# Patient Record
Sex: MALE | Race: WHITE | NOT HISPANIC OR LATINO | Employment: FULL TIME | URBAN - METROPOLITAN AREA
[De-identification: names, ages, dates, MRNs, and addresses within clinical notes are randomized per-mention and may not be internally consistent; named-entity substitution may affect disease eponyms.]

---

## 2017-01-30 ENCOUNTER — HISTORICAL (OUTPATIENT)
Dept: RADIOLOGY | Facility: HOSPITAL | Age: 42
End: 2017-01-30

## 2017-03-29 ENCOUNTER — HISTORICAL (OUTPATIENT)
Dept: ADMINISTRATIVE | Facility: HOSPITAL | Age: 42
End: 2017-03-29

## 2017-03-30 ENCOUNTER — HISTORICAL (OUTPATIENT)
Dept: ADMINISTRATIVE | Facility: HOSPITAL | Age: 42
End: 2017-03-30

## 2017-08-18 ENCOUNTER — HISTORICAL (OUTPATIENT)
Dept: RADIOLOGY | Facility: HOSPITAL | Age: 42
End: 2017-08-18

## 2017-08-18 ENCOUNTER — HISTORICAL (OUTPATIENT)
Dept: LAB | Facility: HOSPITAL | Age: 42
End: 2017-08-18

## 2017-08-18 LAB
ABS NEUT (OLG): 5 X10(3)/MCL (ref 1.5–6.9)
ALBUMIN SERPL-MCNC: 4.1 GM/DL (ref 3.4–5)
ALBUMIN/GLOB SERPL: 1.1 RATIO
ALP SERPL-CCNC: 82 UNIT/L (ref 30–113)
ALT SERPL-CCNC: 38 UNIT/L (ref 10–45)
AST SERPL-CCNC: 15 UNIT/L (ref 15–37)
BASOPHILS # BLD AUTO: 0 X10(3)/MCL (ref 0–0.1)
BASOPHILS NFR BLD AUTO: 0 % (ref 0–1)
BILIRUB SERPL-MCNC: 1.2 MG/DL (ref 0.1–0.9)
BILIRUBIN DIRECT+TOT PNL SERPL-MCNC: 0.2 MG/DL (ref 0–0.3)
BILIRUBIN DIRECT+TOT PNL SERPL-MCNC: 1 MG/DL
BUN SERPL-MCNC: 12 MG/DL (ref 10–20)
CALCIUM SERPL-MCNC: 8.8 MG/DL (ref 8–10.5)
CHLORIDE SERPL-SCNC: 108 MMOL/L (ref 100–108)
CO2 SERPL-SCNC: 26 MMOL/L (ref 21–35)
CREAT SERPL-MCNC: 1.02 MG/DL (ref 0.7–1.3)
DEPRECATED CALCIDIOL+CALCIFEROL SERPL-MC: 33.96 NG/ML (ref 30–80)
EOSINOPHIL # BLD AUTO: 0.1 X10(3)/MCL (ref 0–0.6)
EOSINOPHIL NFR BLD AUTO: 2 % (ref 0–5)
ERYTHROCYTE [DISTWIDTH] IN BLOOD BY AUTOMATED COUNT: 13.2 % (ref 11.5–17)
GLOBULIN SER-MCNC: 3.7 GM/DL
GLUCOSE SERPL-MCNC: 94 MG/DL (ref 75–116)
HCT VFR BLD AUTO: 48.3 % (ref 42–52)
HGB BLD-MCNC: 16.7 GM/DL (ref 14–18)
LYMPHOCYTES # BLD AUTO: 2 X10(3)/MCL (ref 0.5–4.1)
LYMPHOCYTES NFR BLD AUTO: 25.8 % (ref 15–40)
MCH RBC QN AUTO: 29 PG (ref 27–34)
MCHC RBC AUTO-ENTMCNC: 35 GM/DL (ref 31–36)
MCV RBC AUTO: 83 FL (ref 80–99)
MONOCYTES # BLD AUTO: 0.7 X10(3)/MCL (ref 0–1.1)
MONOCYTES NFR BLD AUTO: 9 % (ref 4–12)
NEUTROPHILS # BLD AUTO: 5 X10(3)/MCL (ref 1.5–6.9)
NEUTROPHILS NFR BLD AUTO: 63 % (ref 43–75)
PLATELET # BLD AUTO: 233 X10(3)/MCL (ref 140–400)
PMV BLD AUTO: 10.1 FL (ref 6.8–10)
POTASSIUM SERPL-SCNC: 4.2 MMOL/L (ref 3.6–5.2)
PROT SERPL-MCNC: 7.8 GM/DL (ref 6.4–8.2)
PSA SERPL-MCNC: 1.95 NG/ML (ref 0–4)
RBC # BLD AUTO: 5.79 X10(6)/MCL (ref 4.7–6.1)
SODIUM SERPL-SCNC: 143 MMOL/L (ref 135–145)
T3FREE SERPL-MCNC: 3.62 PG/ML (ref 2.18–3.98)
T4 SERPL-MCNC: 8.3 MCG/DL (ref 5.3–11.5)
TSH SERPL-ACNC: 1.24 MIU/ML (ref 0.36–3.74)
VIT B12 SERPL-MCNC: 410 PG/ML (ref 193–986)
WBC # SPEC AUTO: 8 X10(3)/MCL (ref 4.5–11.5)

## 2017-10-23 ENCOUNTER — HISTORICAL (OUTPATIENT)
Dept: LAB | Facility: HOSPITAL | Age: 42
End: 2017-10-23

## 2017-10-23 LAB
ABS NEUT (OLG): 4.33
BASOPHILS # BLD AUTO: 0.07 X10(3)/MCL
BASOPHILS NFR BLD AUTO: 0.9 %
EOSINOPHIL # BLD AUTO: 0.09 X10(3)/MCL
EOSINOPHIL NFR BLD AUTO: 1.2 %
ERYTHROCYTE [DISTWIDTH] IN BLOOD BY AUTOMATED COUNT: 14 %
HCT VFR BLD AUTO: 51.5 % (ref 39–49)
HGB BLD-MCNC: 17.8 GM/DL (ref 12.6–16.6)
IMM GRANULOCYTES # BLD AUTO: 0.05 10*3/UL (ref 0–0.1)
IMM GRANULOCYTES NFR BLD AUTO: 0.7 % (ref 0–1)
LYMPHOCYTES # BLD AUTO: 2.25 X10(3)/MCL
LYMPHOCYTES NFR BLD AUTO: 29.5 %
MCH RBC QN AUTO: 30.2 PG (ref 27–33)
MCHC RBC AUTO-ENTMCNC: 34.6 GM/DL (ref 32–35)
MCV RBC AUTO: 87.4 FL (ref 84–97)
MONOCYTES # BLD AUTO: 0.83 X10(3)/MCL
MONOCYTES NFR BLD AUTO: 10.9 %
NEUTROPHILS # BLD AUTO: 4.33 X10(3)/MCL
NEUTROPHILS NFR BLD AUTO: 56.8 %
PLATELET # BLD AUTO: 235 X10(3)/MCL (ref 151–368)
PMV BLD AUTO: 10 FL
PSA SERPL-MCNC: 1.73 NG/ML (ref 0–4)
RBC # BLD AUTO: 5.89 X10(6)/MCL (ref 4.3–5.6)
WBC # SPEC AUTO: 7.62 X10(3)/MCL (ref 3.4–9.2)

## 2018-08-15 ENCOUNTER — HISTORICAL (OUTPATIENT)
Dept: LAB | Facility: HOSPITAL | Age: 43
End: 2018-08-15

## 2018-08-15 LAB
ABS NEUT (OLG): 3.51
ALBUMIN SERPL-MCNC: 3.9 GM/DL (ref 3.4–5)
ALBUMIN/GLOB SERPL: 1.1 RATIO (ref 1.1–2)
ALP SERPL-CCNC: 73 UNIT/L (ref 46–116)
ALT SERPL-CCNC: 34 UNIT/L (ref 12–78)
AST SERPL-CCNC: 12 UNIT/L (ref 10–37)
BASOPHILS # BLD AUTO: 0.04 X10(3)/MCL
BASOPHILS NFR BLD AUTO: 0.7 %
BILIRUB SERPL-MCNC: 0.8 MG/DL (ref 0.2–1)
BILIRUBIN DIRECT+TOT PNL SERPL-MCNC: 0.18 MG/DL (ref 0–0.2)
BILIRUBIN DIRECT+TOT PNL SERPL-MCNC: 0.62 MG/DL
BUN SERPL-MCNC: 7 MG/DL (ref 7–18)
CALCIUM SERPL-MCNC: 9.5 MG/DL (ref 8.5–10.1)
CHLORIDE SERPL-SCNC: 104 MMOL/L (ref 98–107)
CHOLEST SERPL-MCNC: 163 MG/DL (ref 50–200)
CHOLEST/HDLC SERPL: 5 {RATIO} (ref 0–5)
CO2 SERPL-SCNC: 24.9 MMOL/L (ref 21–32)
CREAT SERPL-MCNC: 1.12 MG/DL (ref 0.7–1.3)
EOSINOPHIL # BLD AUTO: 0.11 X10(3)/MCL
EOSINOPHIL NFR BLD AUTO: 1.9 %
ERYTHROCYTE [DISTWIDTH] IN BLOOD BY AUTOMATED COUNT: 13 %
GLOBULIN SER-MCNC: 3.7 GM/DL (ref 2.4–3.5)
GLUCOSE SERPL-MCNC: 124 MG/DL (ref 74–106)
HCT VFR BLD AUTO: 50.4 % (ref 39–49)
HDLC SERPL-MCNC: 32 MG/DL (ref 35–60)
HGB BLD-MCNC: 17.7 GM/DL (ref 12.6–16.6)
IMM GRANULOCYTES # BLD AUTO: 0.02 10*3/UL (ref 0–0.1)
IMM GRANULOCYTES NFR BLD AUTO: 0.3 % (ref 0–1)
LDLC SERPL CALC-MCNC: 72 MG/DL (ref 50–140)
LYMPHOCYTES # BLD AUTO: 1.81 X10(3)/MCL
LYMPHOCYTES NFR BLD AUTO: 30.5 %
MCH RBC QN AUTO: 29.4 PG (ref 27–33)
MCHC RBC AUTO-ENTMCNC: 35.1 GM/DL (ref 32–35)
MCV RBC AUTO: 83.7 FL (ref 84–97)
MONOCYTES # BLD AUTO: 0.44 X10(3)/MCL
MONOCYTES NFR BLD AUTO: 7.4 %
NEUTROPHILS # BLD AUTO: 3.51 X10(3)/MCL
NEUTROPHILS NFR BLD AUTO: 59.2 %
PLATELET # BLD AUTO: 233 X10(3)/MCL (ref 151–368)
PMV BLD AUTO: 9 FL
POTASSIUM SERPL-SCNC: 3.8 MMOL/L (ref 3.5–5.1)
PROT SERPL-MCNC: 7.6 GM/DL (ref 6.4–8.2)
PSA SERPL-MCNC: 2.18 NG/ML (ref 0–4)
RBC # BLD AUTO: 6.02 X10(6)/MCL (ref 4.3–5.6)
SODIUM SERPL-SCNC: 139 MMOL/L (ref 136–145)
T3FREE SERPL-MCNC: 3.89 PG/ML (ref 2.18–3.98)
T4 SERPL-MCNC: 6.7 MCG/DL (ref 4.7–13.3)
TRIGL SERPL-MCNC: 293 MG/DL (ref 30–150)
TSH SERPL-ACNC: 0.9 MIU/ML (ref 0.35–3.75)
VIT B12 SERPL-MCNC: 361 PG/ML (ref 193–986)
VLDLC SERPL CALC-MCNC: 59 MG/DL
WBC # SPEC AUTO: 5.93 X10(3)/MCL (ref 3.4–9.2)

## 2018-09-19 ENCOUNTER — HISTORICAL (OUTPATIENT)
Dept: INTENSIVE CARE | Facility: HOSPITAL | Age: 43
End: 2018-09-19

## 2019-03-18 LAB
ABS NEUT (OLG): 9.7 X10(3)/MCL (ref 1.5–6.9)
APPEARANCE, UA: CLEAR
APTT PPP: 28.5 SECOND(S) (ref 25–35)
BACTERIA SPEC CULT: ABNORMAL /HPF
BILIRUB UR QL STRIP: NEGATIVE
BUN SERPL-MCNC: 8 MG/DL (ref 10–20)
CALCIUM SERPL-MCNC: 8.7 MG/DL (ref 8–10.5)
CHLORIDE SERPL-SCNC: 101 MMOL/L (ref 100–108)
CO2 SERPL-SCNC: 26 MMOL/L (ref 21–35)
COLOR UR: ABNORMAL
CREAT SERPL-MCNC: 1.05 MG/DL (ref 0.7–1.3)
CREAT/UREA NIT SERPL: 8
ERYTHROCYTE [DISTWIDTH] IN BLOOD BY AUTOMATED COUNT: 13.2 % (ref 11.5–17)
GLUCOSE (UA): NEGATIVE
GLUCOSE SERPL-MCNC: 83 MG/DL (ref 75–116)
HCT VFR BLD AUTO: 52 % (ref 42–52)
HGB BLD-MCNC: 17 GM/DL (ref 14–18)
HGB UR QL STRIP: ABNORMAL
INR PPP: 1 (ref 0–1.2)
KETONES UR QL STRIP: NEGATIVE
LEUKOCYTE ESTERASE UR QL STRIP: NEGATIVE
MCH RBC QN AUTO: 29 PG (ref 27–34)
MCHC RBC AUTO-ENTMCNC: 33 GM/DL (ref 31–36)
MCV RBC AUTO: 89 FL (ref 80–99)
MUCOUS THREADS URNS QL MICRO: ABNORMAL
NITRITE UR QL STRIP: NEGATIVE
PH UR STRIP: 5.5 [PH]
PLATELET # BLD AUTO: 276 X10(3)/MCL (ref 140–400)
PMV BLD AUTO: 9.7 FL (ref 6.8–10)
POTASSIUM SERPL-SCNC: 3.9 MMOL/L (ref 3.6–5.2)
PROT UR QL STRIP: NEGATIVE
PROTHROMBIN TIME: 10 SECOND(S) (ref 9–12)
RBC # BLD AUTO: 5.87 X10(6)/MCL (ref 4.7–6.1)
RBC #/AREA URNS HPF: ABNORMAL /HPF
SODIUM SERPL-SCNC: 138 MMOL/L (ref 135–145)
SP GR UR STRIP: 1.02
SQUAMOUS EPITHELIAL, UA: ABNORMAL /LPF
UROBILINOGEN UR STRIP-ACNC: 0.2 EU/DL
WBC # SPEC AUTO: 13.2 X10(3)/MCL (ref 4.5–11.5)
WBC #/AREA URNS HPF: ABNORMAL /HPF

## 2019-03-19 ENCOUNTER — HISTORICAL (OUTPATIENT)
Dept: ANESTHESIOLOGY | Facility: HOSPITAL | Age: 44
End: 2019-03-19

## 2019-06-19 ENCOUNTER — HISTORICAL (OUTPATIENT)
Dept: LAB | Facility: HOSPITAL | Age: 44
End: 2019-06-19

## 2019-06-19 LAB
ABS NEUT (OLG): 4.99
BASOPHILS # BLD AUTO: 0.05 X10(3)/MCL
BASOPHILS NFR BLD AUTO: 0.6 %
EOSINOPHIL # BLD AUTO: 0.1 X10(3)/MCL
EOSINOPHIL NFR BLD AUTO: 1.3 %
ERYTHROCYTE [DISTWIDTH] IN BLOOD BY AUTOMATED COUNT: 14 %
HCT VFR BLD AUTO: 53.6 % (ref 39–49)
HGB BLD-MCNC: 18.4 GM/DL (ref 12.6–16.6)
IMM GRANULOCYTES # BLD AUTO: 0.07 10*3/UL (ref 0–0.1)
IMM GRANULOCYTES NFR BLD AUTO: 0.9 % (ref 0–1)
LYMPHOCYTES # BLD AUTO: 1.98 X10(3)/MCL
LYMPHOCYTES NFR BLD AUTO: 25.2 %
MCH RBC QN AUTO: 28.7 PG (ref 27–33)
MCHC RBC AUTO-ENTMCNC: 34.3 GM/DL (ref 32–35)
MCV RBC AUTO: 83.5 FL (ref 84–97)
MONOCYTES # BLD AUTO: 0.68 X10(3)/MCL
MONOCYTES NFR BLD AUTO: 8.6 %
NEUTROPHILS # BLD AUTO: 4.99 X10(3)/MCL
NEUTROPHILS NFR BLD AUTO: 63.4 %
PLATELET # BLD AUTO: 213 X10(3)/MCL (ref 151–368)
PMV BLD AUTO: 9 FL
PSA SERPL-MCNC: 2.21 NG/ML (ref 0–4)
RBC # BLD AUTO: 6.42 X10(6)/MCL (ref 4.3–5.6)
WBC # SPEC AUTO: 7.87 X10(3)/MCL (ref 3.4–9.2)

## 2020-03-25 ENCOUNTER — HISTORICAL (OUTPATIENT)
Dept: RADIOLOGY | Facility: HOSPITAL | Age: 45
End: 2020-03-25

## 2020-03-25 LAB
ABS NEUT (OLG): 5.4 X10(3)/MCL (ref 1.5–6.9)
ALBUMIN SERPL-MCNC: 4.1 GM/DL (ref 3.4–5)
ALBUMIN/GLOB SERPL: 1.1 RATIO
ALP SERPL-CCNC: 67 UNIT/L (ref 30–113)
ALT SERPL-CCNC: 28 UNIT/L (ref 10–45)
AMYLASE SERPL-CCNC: 43 UNIT/L (ref 25–115)
APPEARANCE, UA: CLEAR
AST SERPL-CCNC: 14 UNIT/L (ref 15–37)
BACTERIA SPEC CULT: NORMAL /HPF
BASOPHILS # BLD AUTO: 0.1 X10(3)/MCL (ref 0–0.1)
BASOPHILS NFR BLD AUTO: 1 % (ref 0–1)
BILIRUB SERPL-MCNC: 0.9 MG/DL (ref 0.1–0.9)
BILIRUB UR QL STRIP: NEGATIVE
BILIRUBIN DIRECT+TOT PNL SERPL-MCNC: 0.2 MG/DL (ref 0–0.3)
BILIRUBIN DIRECT+TOT PNL SERPL-MCNC: 0.7 MG/DL
BUN SERPL-MCNC: 14 MG/DL (ref 10–20)
CALCIUM SERPL-MCNC: 8.8 MG/DL (ref 8–10.5)
CHLORIDE SERPL-SCNC: 101 MMOL/L (ref 100–108)
CO2 SERPL-SCNC: 27 MMOL/L (ref 21–35)
COLOR UR: YELLOW
CREAT SERPL-MCNC: 1.16 MG/DL (ref 0.7–1.3)
EOSINOPHIL # BLD AUTO: 0.1 X10(3)/MCL (ref 0–0.6)
EOSINOPHIL NFR BLD AUTO: 1 % (ref 0–5)
ERYTHROCYTE [DISTWIDTH] IN BLOOD BY AUTOMATED COUNT: 12.9 % (ref 11.5–17)
GLOBULIN SER-MCNC: 3.9 GM/DL
GLUCOSE (UA): NEGATIVE
GLUCOSE SERPL-MCNC: 74 MG/DL (ref 75–116)
HCT VFR BLD AUTO: 52.2 % (ref 42–52)
HGB BLD-MCNC: 17.3 GM/DL (ref 14–18)
HGB UR QL STRIP: NEGATIVE
IMM GRANULOCYTES # BLD AUTO: 0.05 10*3/UL (ref 0–0.02)
IMM GRANULOCYTES NFR BLD AUTO: 0.6 % (ref 0–0.43)
KETONES UR QL STRIP: NEGATIVE
LEUKOCYTE ESTERASE UR QL STRIP: ABNORMAL
LIPASE SERPL-CCNC: 133 UNIT/L (ref 21–261)
LYMPHOCYTES # BLD AUTO: 2.2 X10(3)/MCL (ref 0.5–4.1)
LYMPHOCYTES NFR BLD AUTO: 26 % (ref 15–40)
MCH RBC QN AUTO: 30 PG (ref 27–34)
MCHC RBC AUTO-ENTMCNC: 33 GM/DL (ref 31–36)
MCV RBC AUTO: 89 FL (ref 80–99)
MONOCYTES # BLD AUTO: 0.9 X10(3)/MCL (ref 0–1.1)
MONOCYTES NFR BLD AUTO: 10 % (ref 4–12)
NEUTROPHILS # BLD AUTO: 5.4 X10(3)/MCL (ref 1.5–6.9)
NEUTROPHILS NFR BLD AUTO: 62 % (ref 43–75)
NITRITE UR QL STRIP: NEGATIVE
PH UR STRIP: 7 [PH]
PLATELET # BLD AUTO: 262 X10(3)/MCL (ref 140–400)
PMV BLD AUTO: 9.8 FL (ref 6.8–10)
POTASSIUM SERPL-SCNC: 3.8 MMOL/L (ref 3.6–5.2)
PROT SERPL-MCNC: 8 GM/DL (ref 6.4–8.2)
PROT UR QL STRIP: NEGATIVE
RBC # BLD AUTO: 5.87 X10(6)/MCL (ref 4.7–6.1)
RBC #/AREA URNS HPF: NORMAL /HPF
SODIUM SERPL-SCNC: 138 MMOL/L (ref 135–145)
SP GR UR STRIP: 1.02
SQUAMOUS EPITHELIAL, UA: NORMAL /LPF
UROBILINOGEN UR STRIP-ACNC: 1 EU/DL
WBC # SPEC AUTO: 8.7 X10(3)/MCL (ref 4.5–11.5)
WBC #/AREA URNS HPF: NORMAL /HPF

## 2020-03-28 LAB — FINAL CULTURE: NO GROWTH

## 2020-04-29 ENCOUNTER — HISTORICAL (OUTPATIENT)
Dept: RADIOLOGY | Facility: HOSPITAL | Age: 45
End: 2020-04-29

## 2020-05-11 ENCOUNTER — HOSPITAL ENCOUNTER (OUTPATIENT)
Dept: MEDSURG UNIT | Facility: HOSPITAL | Age: 45
End: 2020-05-12
Attending: SURGERY | Admitting: SURGERY

## 2020-05-11 LAB
ABS NEUT (OLG): 5.2 X10(3)/MCL (ref 1.5–6.9)
ALBUMIN SERPL-MCNC: 4.3 GM/DL (ref 3.5–5)
ALBUMIN/GLOB SERPL: 1.2 RATIO (ref 1.1–2)
ALP SERPL-CCNC: 61 UNIT/L (ref 40–150)
ALT SERPL-CCNC: 24 UNIT/L (ref 0–55)
AMYLASE SERPL-CCNC: 42 UNIT/L (ref 25–125)
APTT PPP: 30.6 SECOND(S) (ref 25–35)
AST SERPL-CCNC: 20 UNIT/L (ref 5–34)
BASOPHILS # BLD AUTO: 0 X10(3)/MCL (ref 0–0.1)
BASOPHILS NFR BLD AUTO: 1 % (ref 0–1)
BILIRUB SERPL-MCNC: 0.7 MG/DL
BILIRUBIN DIRECT+TOT PNL SERPL-MCNC: 0.1 MG/DL (ref 0–0.5)
BILIRUBIN DIRECT+TOT PNL SERPL-MCNC: 0.6 MG/DL (ref 0–0.8)
BUN SERPL-MCNC: 11 MG/DL (ref 8.9–20.6)
CALCIUM SERPL-MCNC: 9.3 MG/DL (ref 8.4–10.2)
CHLORIDE SERPL-SCNC: 104 MMOL/L (ref 98–107)
CO2 SERPL-SCNC: 21 MMOL/L (ref 22–29)
CREAT SERPL-MCNC: 1 MG/DL (ref 0.73–1.18)
EOSINOPHIL # BLD AUTO: 0.1 X10(3)/MCL (ref 0–0.6)
EOSINOPHIL NFR BLD AUTO: 1 % (ref 0–5)
ERYTHROCYTE [DISTWIDTH] IN BLOOD BY AUTOMATED COUNT: 12.3 % (ref 11.5–17)
GLOBULIN SER-MCNC: 3.5 GM/DL (ref 2.4–3.5)
GLUCOSE SERPL-MCNC: 90 MG/DL (ref 74–100)
HCT VFR BLD AUTO: 51.7 % (ref 42–52)
HGB BLD-MCNC: 18.1 GM/DL (ref 14–18)
IMM GRANULOCYTES # BLD AUTO: 0.04 10*3/UL (ref 0–0.02)
IMM GRANULOCYTES NFR BLD AUTO: 0.5 % (ref 0–0.43)
INR PPP: 1 (ref 0–1.2)
LIPASE SERPL-CCNC: 29 U/L
LYMPHOCYTES # BLD AUTO: 2.5 X10(3)/MCL (ref 0.5–4.1)
LYMPHOCYTES NFR BLD AUTO: 29 % (ref 15–40)
MCH RBC QN AUTO: 30 PG (ref 27–34)
MCHC RBC AUTO-ENTMCNC: 35 GM/DL (ref 31–36)
MCV RBC AUTO: 85 FL (ref 80–99)
MONOCYTES # BLD AUTO: 0.8 X10(3)/MCL (ref 0–1.1)
MONOCYTES NFR BLD AUTO: 9 % (ref 4–12)
NEUTROPHILS # BLD AUTO: 5.2 X10(3)/MCL (ref 1.5–6.9)
NEUTROPHILS NFR BLD AUTO: 60 % (ref 43–75)
PLATELET # BLD AUTO: 206 X10(3)/MCL (ref 140–400)
PMV BLD AUTO: 9.5 FL (ref 6.8–10)
POTASSIUM SERPL-SCNC: 4 MMOL/L (ref 3.5–5.1)
PROT SERPL-MCNC: 7.8 GM/DL (ref 6.4–8.3)
PROTHROMBIN TIME: 12.6 SECOND(S) (ref 9–12)
RBC # BLD AUTO: 6.07 X10(6)/MCL (ref 4.7–6.1)
SARS-COV-2 RNA RESP QL NAA+PROBE: NOT DETECTED
SODIUM SERPL-SCNC: 138 MMOL/L (ref 136–145)
WBC # SPEC AUTO: 8.6 X10(3)/MCL (ref 4.5–11.5)

## 2020-05-12 LAB
ABS NEUT (OLG): 3.7 X10(3)/MCL (ref 1.5–6.9)
ALBUMIN SERPL-MCNC: 3.6 GM/DL (ref 3.5–5)
ALBUMIN/GLOB SERPL: 1.3 RATIO (ref 1.1–2)
ALP SERPL-CCNC: 55 UNIT/L (ref 40–150)
ALT SERPL-CCNC: 16 UNIT/L (ref 0–55)
AST SERPL-CCNC: 15 UNIT/L (ref 5–34)
BASOPHILS # BLD AUTO: 0.1 X10(3)/MCL (ref 0–0.1)
BASOPHILS NFR BLD AUTO: 1 % (ref 0–1)
BILIRUB SERPL-MCNC: 1 MG/DL
BILIRUBIN DIRECT+TOT PNL SERPL-MCNC: 0.3 MG/DL (ref 0–0.5)
BILIRUBIN DIRECT+TOT PNL SERPL-MCNC: 0.7 MG/DL (ref 0–0.8)
BUN SERPL-MCNC: 11 MG/DL (ref 8.9–20.6)
CALCIUM SERPL-MCNC: 8.8 MG/DL (ref 8.4–10.2)
CHLORIDE SERPL-SCNC: 103 MMOL/L (ref 98–107)
CO2 SERPL-SCNC: 29 MMOL/L (ref 22–29)
CREAT SERPL-MCNC: 1.21 MG/DL (ref 0.73–1.18)
EOSINOPHIL # BLD AUTO: 0.1 X10(3)/MCL (ref 0–0.6)
EOSINOPHIL NFR BLD AUTO: 2 % (ref 0–5)
ERYTHROCYTE [DISTWIDTH] IN BLOOD BY AUTOMATED COUNT: 12.7 % (ref 11.5–17)
GLOBULIN SER-MCNC: 2.8 GM/DL (ref 2.4–3.5)
GLUCOSE SERPL-MCNC: 84 MG/DL (ref 74–100)
HCT VFR BLD AUTO: 50.3 % (ref 42–52)
HGB BLD-MCNC: 16.9 GM/DL (ref 14–18)
IMM GRANULOCYTES # BLD AUTO: 0.06 10*3/UL (ref 0–0.02)
IMM GRANULOCYTES NFR BLD AUTO: 0.9 % (ref 0–0.43)
LYMPHOCYTES # BLD AUTO: 2 X10(3)/MCL (ref 0.5–4.1)
LYMPHOCYTES NFR BLD AUTO: 31 % (ref 15–40)
MCH RBC QN AUTO: 29 PG (ref 27–34)
MCHC RBC AUTO-ENTMCNC: 34 GM/DL (ref 31–36)
MCV RBC AUTO: 87 FL (ref 80–99)
MONOCYTES # BLD AUTO: 0.6 X10(3)/MCL (ref 0–1.1)
MONOCYTES NFR BLD AUTO: 9 % (ref 4–12)
NEUTROPHILS # BLD AUTO: 3.7 X10(3)/MCL (ref 1.5–6.9)
NEUTROPHILS NFR BLD AUTO: 56 % (ref 43–75)
PLATELET # BLD AUTO: 169 X10(3)/MCL (ref 140–400)
PMV BLD AUTO: 9.6 FL (ref 6.8–10)
POTASSIUM SERPL-SCNC: 4.3 MMOL/L (ref 3.5–5.1)
PROT SERPL-MCNC: 6.4 GM/DL (ref 6.4–8.3)
RBC # BLD AUTO: 5.77 X10(6)/MCL (ref 4.7–6.1)
SODIUM SERPL-SCNC: 138 MMOL/L (ref 136–145)
WBC # SPEC AUTO: 6.5 X10(3)/MCL (ref 4.5–11.5)

## 2020-05-17 LAB
FINAL CULTURE: NORMAL
FINAL CULTURE: NORMAL

## 2021-02-19 ENCOUNTER — HISTORICAL (OUTPATIENT)
Dept: LAB | Facility: HOSPITAL | Age: 46
End: 2021-02-19

## 2021-02-19 LAB
ABS NEUT (OLG): 4.17
BASOPHILS # BLD AUTO: 0.05 X10(3)/MCL
BASOPHILS NFR BLD AUTO: 0.7 %
CHOLEST SERPL-MCNC: 207 MG/DL
CHOLEST/HDLC SERPL: 5 {RATIO} (ref 0–5)
DEPRECATED CALCIDIOL+CALCIFEROL SERPL-MC: 25.2 NG/ML (ref 30–80)
EOSINOPHIL # BLD AUTO: 0.13 X10(3)/MCL
EOSINOPHIL NFR BLD AUTO: 1.8 %
ERYTHROCYTE [DISTWIDTH] IN BLOOD BY AUTOMATED COUNT: 14 %
EST. AVERAGE GLUCOSE BLD GHB EST-MCNC: 100 MG/DL
ESTRADIOL SERPL HS-MCNC: <24 PG/ML
HBA1C MFR BLD: 5.1 % (ref 4–6)
HCT VFR BLD AUTO: 52.3 % (ref 39–49)
HDLC SERPL-MCNC: 43 MG/DL (ref 35–60)
HGB BLD-MCNC: 18.2 GM/DL (ref 12.6–16.6)
IMM GRANULOCYTES # BLD AUTO: 0.06 10*3/UL (ref 0–0.1)
IMM GRANULOCYTES NFR BLD AUTO: 0.8 % (ref 0–1)
LDLC SERPL CALC-MCNC: 116 MG/DL (ref 50–140)
LYMPHOCYTES # BLD AUTO: 2.17 X10(3)/MCL
LYMPHOCYTES NFR BLD AUTO: 30.4 %
MCH RBC QN AUTO: 29.4 PG (ref 27–33)
MCHC RBC AUTO-ENTMCNC: 34.8 GM/DL (ref 32–35)
MCV RBC AUTO: 84.4 FL (ref 84–97)
MONOCYTES # BLD AUTO: 0.56 X10(3)/MCL
MONOCYTES NFR BLD AUTO: 7.8 %
NEUTROPHILS # BLD AUTO: 4.17 X10(3)/MCL
NEUTROPHILS NFR BLD AUTO: 58.5 %
PLATELET # BLD AUTO: 237 X10(3)/MCL (ref 140–450)
PMV BLD AUTO: 10 FL
PSA SERPL-MCNC: 5.34 NG/ML
RBC # BLD AUTO: 6.2 X10(6)/MCL (ref 4.3–5.6)
T3FREE SERPL-MCNC: 4.34 PG/ML (ref 1.71–3.71)
T4 SERPL-MCNC: 8.35 UG/DL (ref 4.87–11.72)
TRIGL SERPL-MCNC: 239 MG/DL (ref 34–140)
TSH SERPL-ACNC: 0.96 UIU/ML (ref 0.35–4.94)
VIT B12 SERPL-MCNC: 330 PG/ML (ref 213–816)
VLDLC SERPL CALC-MCNC: 48 MG/DL
WBC # SPEC AUTO: 7.14 X10(3)/MCL (ref 3.4–9.2)

## 2021-02-23 ENCOUNTER — HISTORICAL (OUTPATIENT)
Dept: LAB | Facility: HOSPITAL | Age: 46
End: 2021-02-23

## 2021-02-23 LAB
APPEARANCE, UA: CLEAR
BACTERIA SPEC CULT: ABNORMAL
BILIRUB UR QL STRIP: NEGATIVE
COLOR UR: YELLOW
GLUCOSE (UA): NEGATIVE
HGB UR QL STRIP: NEGATIVE
KETONES UR QL STRIP: NEGATIVE
LEUKOCYTE ESTERASE UR QL STRIP: NEGATIVE
MUCOUS THREADS URNS QL MICRO: PRESENT
NITRITE UR QL STRIP: NEGATIVE
PH UR STRIP: 5.5 [PH] (ref 4.6–8)
PROT UR QL STRIP: NEGATIVE
RBC #/AREA URNS HPF: ABNORMAL /HPF
SP GR UR STRIP: >=1.03 (ref 1–1.03)
SQUAMOUS EPITHELIAL, UA: ABNORMAL
UROBILINOGEN UR STRIP-ACNC: 0.2
WBC #/AREA URNS HPF: ABNORMAL /HPF

## 2021-02-25 LAB — FINAL CULTURE: NO GROWTH

## 2021-03-16 ENCOUNTER — HISTORICAL (OUTPATIENT)
Dept: LAB | Facility: HOSPITAL | Age: 46
End: 2021-03-16

## 2021-03-16 LAB — PSA SERPL-MCNC: 5.37 NG/ML

## 2021-10-19 ENCOUNTER — HISTORICAL (OUTPATIENT)
Dept: LAB | Facility: HOSPITAL | Age: 46
End: 2021-10-19

## 2021-10-19 LAB
ABS NEUT (OLG): 6.51
BASOPHILS # BLD AUTO: 0.08 X10(3)/MCL
BASOPHILS NFR BLD AUTO: 0.9 %
EOSINOPHIL # BLD AUTO: 0.09 X10(3)/MCL
EOSINOPHIL NFR BLD AUTO: 1 %
ERYTHROCYTE [DISTWIDTH] IN BLOOD BY AUTOMATED COUNT: 14 %
ESTRADIOL SERPL HS-MCNC: 26 PG/ML
HCT VFR BLD AUTO: 54 % (ref 39–49)
HGB BLD-MCNC: 18.2 GM/DL (ref 12.6–16.6)
IMM GRANULOCYTES # BLD AUTO: 0.05 10*3/UL (ref 0–0.1)
IMM GRANULOCYTES NFR BLD AUTO: 0.5 % (ref 0–1)
LYMPHOCYTES # BLD AUTO: 2.11 X10(3)/MCL
LYMPHOCYTES NFR BLD AUTO: 22.5 %
MCH RBC QN AUTO: 29 PG (ref 27–33)
MCHC RBC AUTO-ENTMCNC: 33.7 GM/DL (ref 32–35)
MCV RBC AUTO: 86.1 FL (ref 84–97)
MONOCYTES # BLD AUTO: 0.54 X10(3)/MCL
MONOCYTES NFR BLD AUTO: 5.8 %
NEUTROPHILS # BLD AUTO: 6.51 X10(3)/MCL
NEUTROPHILS NFR BLD AUTO: 69.3 %
PLATELET # BLD AUTO: 266 X10(3)/MCL (ref 140–450)
PMV BLD AUTO: 9 FL
RBC # BLD AUTO: 6.27 X10(6)/MCL (ref 4.3–5.6)
WBC # SPEC AUTO: 9.38 X10(3)/MCL (ref 3.4–9.2)

## 2022-02-17 ENCOUNTER — HISTORICAL (OUTPATIENT)
Dept: RADIOLOGY | Facility: HOSPITAL | Age: 47
End: 2022-02-17

## 2022-02-17 ENCOUNTER — HISTORICAL (OUTPATIENT)
Dept: ADMINISTRATIVE | Facility: HOSPITAL | Age: 47
End: 2022-02-17

## 2022-04-30 NOTE — OP NOTE
PROCEDURE:  Esophagogastroduodenoscopy.    INDICATION FOR PROCEDURE:  This is a 43-year-old white gentleman with recurrent epigastric pain, recurrent nausea and vomiting, refractory to outpatient medical therapy, so he is set up for EGD.    PROCEDURE IN DETAIL:  Informed consent was obtained.  Risks and benefits were explained.  He agreed to have procedure done and signed the consents, was wheeled into the endoscopy area at outpatient services of [facility].  He was laid in the left lateral decubitus position.  A bite block was placed, and he was monitored and sedated per Anesthesia.  Once anesthesia was given, the Olympus gastroscope was lubricated, and advanced with ease to the posterior oropharynx, through the esophagus into the stomach.  I directed the scope through the stomach, through the pylorus to the second part of the duodenum, and insufflated air.  Had a normal distal duodenum, normal proximal jejunum.  Pictures were taken of both.  I slowly withdrew the scope.  The proximal duodenum was minimally inflamed consistent with duodenitis.  I withdrew the scope into the antrum and he had grade 1 gastritis.  A biopsy was taken of the antrum for evaluation.  At this time I insufflated air to visualize between the rugae folds.  There were no significant lesions noted.  There were mild changes consistent with grade 1 gastritis.  I withdrew the scope into the esophageal area of the distal esophagus, it was normal.  The Z-line appeared to be normal.  There was less than 1 cm up between the Z-line and the diaphragm.  I withdrew the scope.  The mid esophagus was normal and the proximal esophagus was normal.  Upon passing the upper esophageal sphincter, I noted significant tissue edema in the posterior oropharyngeal area.  I directed the scope to visualize the vocal cords.  I aspirated mucous noting, he had edematous tissue around his vocal cord area as well.  I withdrew the scope.  The patient  tolerated the procedure well.  It is of note that the patient has significant a gag reflex when attempting to place the bite block which I feel is part of his daily vomiting.    POSTOPERATIVE DIAGNOSES:    1. Significant gag reflex.    2. Grade 1 gastritis, status post biopsy of the antrum.    3. Edematous tissue in the posterior oropharyngeal and oral area believed to be secondary due to smoking.    RECOMMENDATIONS:    1. Continue the proton pump inhibitor.    2. Recommend he stop smoking.    3. Follow up in my clinic in 1-2 weeks for pathology review, at that time, we will make further recommendations.        ANABEL/HEMA   DD: 03/19/2019 0659   DT: 03/19/2019 0721  Job # [id number]

## 2022-04-30 NOTE — OP NOTE
Patient:   Raheel Knowles            MRN: [id number]            FIN: [id number]               Age:   44 years     Sex:  Male     :  1975   Associated Diagnoses:   Cholecystitis; Abdominal pain   Author:   Fanny Ta MD      Operative Note   Operative Information   Date/ Time:  2020 14:00:00.     Procedures Performed: Procedure Code   Laparoscopy, surgical; cholecystectomy (26702)..     Indications: 44-year-old white male presenting with right upper quadrant pain discomfort associated with meals and persistent pain unrelieved by narcotics believed to have acute cholecystitis.     Preoperative Diagnosis: Cholecystitis (GRE79-HL K81.9), Abdominal pain (PNED [id number]).     Postoperative Diagnosis: Cholecystitis (TCD19-PP K81.9), Abdominal pain (PNED [id number]).     Surgeon: Fanny Ta MD.     Anesthesia: General.     Speciman Removed: Gallbladder.     Description of Procedure/Findings/    Complications: After the proper consent was obtained patient was brought to the operating theater laid in the supine position general endotracheal intubation and anesthesia was performed.  An NG tube was placed prior to beginning the procedure and the abdomen was sterilely prepped and draped in normal surgical fashion.  An incision was made in the infraumbilical region after infiltration 1% lidocaine and epinephrine using a #15 blade.  Dissection was carried down to the level of the fascia and the abdomen was entered carefully using a  Veress needle.  The abdomen was then insufflated to 15 mmHg,  a 5 mm Visiport was used to enter the abdomen and the abdominal contents were visually inspected.  Under direct visualization secondary trochars were inserted into the abdomen, a 5 mm trocar was inserted in the subxiphoid region and 2 5 mm trochars were inserted and the right quadrant.  There were no injuries noted during insertion of the trochars.   The patient was then placed in reverse Trendelenburg position with right side up.  There were firm adhesions between the gallbladder and omentum which were lysed sharply.   the gallbladder was grossly edematous and thickened which was difficult to grasp.   cholecystotomy was performed to decompress the gallbladder and its contents were suctioned.  The dome of the gallbladder was then grasped with atraumatic grasper through the most lateral port and retracted over the dome of the liver the infundibulum was then grasped using an atraumatic grasper through the midclavicular port and retracted to the right lower quadrant.  This was able to expose the triangle of calot.  The peritoneum overlying the gallbladder infundibulum was then incised and the cystic duct and artery were identified and circumferentially dissected carefully.  The critical view was obtained with the cystic duct and cystic artery entering clearly into the gallbladder with the liver in the background.  The cystic duct and cystic artery were then doubly clipped and divided close to the gallbladder. The gallbladder was then dissected from its peritoneal attachments using electrocautery.   Hemostasis was checked and the gallbladder was placed within an endoscopic retrieval bag and removed through the subxiphoid port.  The gallbladder was then passed off the table as a specimen.  The gallbladder fossa was irrigated with saline to assure hemostasis.  No evidence of bleeding from the fossa or the cystic artery were identified.  There was no leakage noted from the cystic stump.  Secondary trochars were then removed under direct visualization.  There was no noted bleeding at any of the trocar sites.  The laparoscope was then withdrawn through the umbilical port and insufflation was allowed to escape.   The skin was closed in a subcuticular fashion using 4-0 Monocryl in a sterile dressing was placed upon the skin.  The patient tolerated the procedure well and  was transferred to the postanesthesia care unit in stable condition..     Esimated blood loss: loss  5  cc.     Complications: None.

## 2022-04-30 NOTE — ED PROVIDER NOTES
Patient:   Raheel Knowles            MRN: [id number]            FIN: [id number]               Age:   44 years     Sex:  Male     :  1975   Associated Diagnoses:   Cholecystitis   Author:   Vaishali Amador MD      Basic Information   Additional information: Chief Complaint from Nursing Triage Note : Chief Complaint   2020 16:46 CDT      Chief Complaint           C/o abdominal pain and that his gallbladder is bad.    2020 13:57 CDT      Chief Complaint           Referral from Dr Denny Dias for gallbladder  .      History of Present Illness   The patient presents with abdominal pain.  The onset was 4  days ago.  The course/duration of symptoms is constant.  The character of symptoms is pressure.  The degree at onset was moderate.  The Location of pain at onset was right, diffuse, upper and abdominal.  The degree at present is moderate.  The Location of pain at present is right, diffuse, upper and abdominal.  Radiating pain: none. The exacerbating factor is none.  The relieving factor is none.  Therapy today: none.  Risk factors consist of hypertension.  Associated symptoms: nausea, denies vomiting, denies diarrhea, denies fever and denies chills.  Patient who has been having abdominal pain for more than a month now, had workup done was told that his gallbladder is bad and it needs to come out, today he went to see the surgeon and after he left the office the pain got worse and he decided to come to the emergency room to see what can be done about it..        Review of Systems   Constitutional symptoms:  No fever, no chills, no sweats.    Skin symptoms:  No jaundice, no rash.    Eye symptoms:  Negative except as documented in HPI.   ENMT symptoms:  No sore throat,    Respiratory symptoms:  No shortness of breath, no cough, no wheezing.    Cardiovascular symptoms:  No chest pain, no palpitations, no tachycardia.    Gastrointestinal symptoms:  Abdominal pain, moderate, diffuse, right  upper quadrant, pressure, no nausea, no vomiting, no diarrhea, no constipation.    Genitourinary symptoms:  No dysuria,    Musculoskeletal symptoms:  No back pain,    Neurologic symptoms:  No headache, no dizziness.    Psychiatric symptoms:  No anxiety, no depression, no substance abuse.    Allergy/immunologic symptoms:  No seasonal allergies,              Additional review of systems information: All other systems reviewed and otherwise negative.      Health Status   Allergies:    Allergic Reactions (Selected)  No Known Medication Allergies,    Allergies (1) Active Reaction  No Known Medication Allergies None Documented  .   Medications:  (Selected)   Prescriptions  Prescribed  Carafate 1 g oral tablet: 1 gm = 1 tab(s), Oral, QID, # 120 tab(s), 0 Refill(s), Pharmacy: [facility], 187.9, cm, Height/Length Dosing, 05/11/20 13:57:00 CDT, 95.3, kg, Weight Dosing, 05/11/20 13:57:00 CDT  Cipro 500 mg oral tablet: 500 mg = 1 tab(s), Oral, q12hr, X 7 day(s), # 14 tab(s), 0 Refill(s), Pharmacy: [facility], 187.9, cm, Height/Length Dosing, 05/11/20 13:57:00 CDT, 95.3, kg, Weight Dosing, 05/11/20 13:57:00 CDT  Flagyl 500 mg oral tablet: 500 mg = 1 tab(s), Oral, q8hr, X 7 day(s), # 21 tab(s), 0 Refill(s), Pharmacy: [facility], 187.9, cm, Height/Length Dosing, 05/11/20 13:57:00 CDT, 95.3, kg, Weight Dosing, 05/11/20 13:57:00 CDT  Keppra 500 mg oral tablet: 500 mg = 1 tab(s), Oral, BID, # 60 tab(s), 5 Refill(s), Pharmacy: [facility]  Protonix 40 mg ORAL enteric coated tablet: 40 mg = 1 tab(s), Oral, BID, # 60 tab(s), 0 Refill(s), Pharmacy: [facility], 187.9, cm, Height/Length Dosing, 05/11/20 13:57:00 CDT, 95.3, kg, Weight Dosing, 05/11/20 13:57:00 CDT  omeprazole 40 mg oral DR capsule: 40 mg, Oral, Daily, # 30 cap(s), 6 Refill(s), Pharmacy: [facility], per nurse's notes.      Past Medical/ Family/ Social History   Medical history:    Active  GERD (gastroesophageal  reflux disease) (SNOMED CT [id number])  Hypertension (SNOMED CT [id number])  Hypertriglyceridemia (SNOMED CT 570700827)  Hypogonadism (SNOMED CT 76832118)  Anxiety disorder (SNOMED CT 233754081)  Resolved  Fatigue (SNOMED CT 733943539):  Resolved.  Gross hematuria (SNOMED CT 3713646375):  Resolved., Reviewed as documented in chart.   Surgical history:    Biopsy Gastrointestinal on 3/19/2019 at 43 Years.  Comments:  3/19/2019 6:53 Yanira Garza  auto-populated from documented surgical case  Esophagogastroduodenoscopy on 3/19/2019 at 43 Years.  Comments:  3/19/2019 6:53 Yanira Garza  auto-populated from documented surgical case  Xyphoidectomy (.) on 3/30/2017 at 41 Years.  Comments:  3/30/2017 7:55 MELCHORT - Lencho GILLILAND, Mallory  auto-populated from documented surgical case  EGD.  circ., Reviewed as documented in chart.   Family history:    Primary malignant neoplasm of colon  Father  , Reviewed as documented in chart.   Social history:    Social & Psychosocial Habits    Alcohol  07/21/2016  Use: Never    Employment/School  05/11/2020  Status: Employed    Description: Self employed    Highest education: High school    Exercise  05/06/2020  Duration (average number of minutes): 30    Times per week: Daily    Exercise type: Walking    Home/Environment  05/11/2020  Lives with: Spouse    Living situation: Home/Independent    Concerns over TV/Computer/Game use: No    Home Type Mobile home    Nutrition/Health  05/06/2020  Home Diet Regular    Appetite Good    Sexual  05/11/2020  Sexually active: Yes    Current partners: 1    Do you think of your sexual orientation as: Straight or heterosexual    What is your current gender identity? (Check all that apply) Identifies as male    History of STIs No    Substance Use  07/19/2016  Use: Never    Tobacco  05/11/2020  Use: 10 or more cigarettes (1/    Type: Cigarettes    Patient Wants Consult For Cessation  Counseling No    Concerns about tobacco use in household: No    05/11/2020  Use: 10 or more cigarettes (1/    Patient Wants Consult For Cessation Counseling No    Abuse/Neglect  05/11/2020  SHX Any signs of abuse or neglect No    Feels unsafe at home: No    Safe place to go: Yes    05/11/2020  SHX Any signs of abuse or neglect No    Spiritual/Cultural  05/11/2020  Restorationist Preference Yarsanism    Spiritual Services to Visit? No  , Reviewed as documented in chart.   Problem list:    Active Problems (20)  Abdominal pain   Abnormal imaging   Anorexia   Anterior chest wall pain   Anxiety   Anxiety disorder   Back pain   Bilateral inguinal hernia   Cyst of kidney   Depression   Enlarged prostate   GERD (gastroesophageal reflux disease)   Hypertension   Hypertriglyceridemia   Hypogonadism   Kidney stones   Nausea and vomiting   Right upper quadrant pain   Tobacco user   Vitamin D deficiency   , per nurse's notes.      Physical Examination               Vital Signs   Vital Signs   5/11/2020 16:46 CDT      Temperature Oral          36.7 DegC                             Temperature Oral (calculated)             98.06 DegF                             Peripheral Pulse Rate     70 bpm                             Respiratory Rate          22 br/min                             SpO2                      98 %                             Oxygen Therapy            Room air                             Systolic Blood Pressure   153 mmHg  HI                             Diastolic Blood Pressure  99 mmHg  HI  .   Oxygen saturation.   General:  Alert, no acute distress.    Glenis coma scale:  Total score: Total score: 15.   Neurological:  Alert and oriented to person, place, time, and situation, normal motor observed, normal speech observed.    Skin:  Normal for ethnicity.   Head:  Normocephalic.   Neck:  Supple.   Eye:  Extraocular movements are intact, No icterus.    Ears, nose, mouth and throat:  Oral mucosa moist, no pharyngeal  erythema or exudate.    Cardiovascular:  Regular rate and rhythm, No murmur, Normal peripheral perfusion, No edema.    Respiratory:  Lungs are clear to auscultation, respirations are non-labored, breath sounds are equal.    Back:  Nontender, Normal range of motion.    Musculoskeletal:  Normal ROM.   Chest wall   Gastrointestinal:  Soft, Non distended, Normal bowel sounds, Tenderness: Mild, generalized.    Lymphatics   Psychiatric:  Cooperative, appropriate mood & affect.       Medical Decision Making   Documents reviewed:  Emergency department nurses' notes.   Orders  Launch Orders   Pharmacy:  Lactated Ringers 1000ml 1,000 mL (Order): 1,000 mL, 1,000 mL, IV, 155 mL/hr, start date 5/11/2020 17:11 CDT, 2.06, m2  IVF Lactated Ringers LR Bolus 1000ml 1,000 mL (Order): 1,000 mL, 1,000 mL, IV, Bolus, start date 5/11/2020 17:11 CDT, 2.06, m2, Launch Orders   Pharmacy:  Toradol (Order): 15 mg, form: Injection, IV, Once-NOW, first dose 5/11/2020 17:12 CDT, stop date 5/11/2020 17:12 CDT, STAT  Zofran 2 mg/mL injectable solution (Order): 4 mg, form: Injection, IV Push, Once-NOW, first dose 5/11/2020 17:12 CDT, stop date 5/11/2020 17:12 CDT, STAT, Launch Orders   Cardiology:  EKG Adult 12 Lead (Order): 5/11/2020 17:18 CDT, Stat, Once-NOW, 5/11/2020 17:18 CDT, Ambulatory, Standard Precautions, -1, -1, 5/11/2020 17:18 CDT, launch Order Profile (Selected)   Inpatient Orders  Ordered  Metronidazole 500 mg / 100 ml premix: 500 mg, form: Infusion, IV, q8hr, Infuse over: 1 hr, first dose 05/11/20 17:04:00 CDT, NOW  Patient Isolation: 05/11/20 17:03:30 CDT, Contact Precautions, Constant Indicator  Patient Isolation: 05/11/20 17:03:30 CDT, Droplet Precautions, Constant Indicator  Saline Lock Insert: 05/11/20 17:02:00 CDT, Once-NOW, Stop date 05/11/20 17:02:00 CDT  Zosyn 3.375 gm (for IVPB): 3.375 gm, IV, q8hr, first dose 05/11/20 17:04:00 CDT, STAT  Ordered (Dispatched)  Amylase Level: Stat collect, 05/11/20 17:02:00 CDT, Blood, Stop  date 05/11/20 17:03:00 CDT, Lab Collect, 05/11/20 17:02:00 CDT  Blood Culture: 05/11/20 17:04:00 CDT, Stat collect, Blood, Stop date 05/11/20 17:05:00 CDT  Blood Culture: 05/11/20 17:04:00 CDT, Stat collect, Blood, Stop date 05/11/20 17:05:00 CDT  CBC w/ Auto Diff: Stat collect, 05/11/20 17:02:00 CDT, Blood, Stop date 05/11/20 17:03:00 CDT, Lab Collect, 05/11/20 17:02:00 CDT  CMP: Stat collect, 05/11/20 17:02:00 CDT, Blood, Stop date 05/11/20 17:03:00 CDT, Lab Collect, 05/11/20 17:02:00 CDT  Lipase Level: Stat collect, 05/11/20 17:02:00 CDT, Blood, Stop date 05/11/20 17:03:00 CDT, Lab Collect, 05/11/20 17:02:00 CDT  PT (Protime): Stat collect, 05/11/20 17:02:00 CDT, Blood, Stop date 05/11/20 17:03:00 CDT, Lab Collect, 05/11/20 17:02:00 CDT  PTT: Stat collect, 05/11/20 17:02:00 CDT, Blood, Stop date 05/11/20 17:03:00 CDT, Lab Collect, 05/11/20 17:02:00 CDT  Urinalysis with Microscopic if Indicated: Stat collect, Urine, 05/11/20 17:02:00 CDT, Stop date 05/11/20 17:03:00 CDT, Nurse collect  Ordered (Exam Ordered)  CT Abdomen and Pelvis W W/O Contrast: Stat, 05/11/20 17:04:00 CDT, Abdominal Pain, None, Ambulatory, Patient Has IV?, IV Contrast only/Pancreatitis Protocol, Rad Type, Schedule this test, 05/11/20 17:04:00 CDT  XR Chest 1 View: Stat, 05/11/20 17:03:00 CDT, Pre-Op, None, Ambulatory, Rad Type, Not Scheduled, 05/11/20 17:03:00 CDT  Ordered (In Transit)  COVID-19 PCR - Inpatient only [facility]: Stat collect, 05/11/20 17:02:00 CDT, Nasopharyngeal Swab, Nurse collect, Stop date 05/11/20 17:03:00 CDT.    Electrocardiogram:  Time 5/11/2020 17:30:00, rate 75, normal sinus rhythm, No ST-T changes, no ectopy, normal MD & QRS intervals.    Results review:  Lab results : Lab View   5/11/2020 17:22 CDT      Sodium Lvl                138 mmol/L                             Potassium Lvl             4.0 mmol/L                             Chloride                  104 mmol/L                             CO2                       21  mmol/L  LOW                             Calcium Lvl               9.3 mg/dL                             Glucose Lvl               90 mg/dL                             BUN                       11.0 mg/dL                             Creatinine                1.00 mg/dL                             eGFR-AA                   >60  NA                             eGFR-OMAR                  >60  NA                             Amylase Lvl               42 unit/L                             Bili Total                0.7 mg/dL                             Bili Direct               0.1 mg/dL                             Bili Indirect             0.60 mg/dL                             AST                       20 unit/L                             ALT                       24 unit/L                             Alk Phos                  61 unit/L                             Total Protein             7.8 gm/dL                             Albumin Lvl               4.3 gm/dL                             Globulin                  3.5 gm/dL                             A/G Ratio                 1.2 ratio                             Lipase Lvl                29 U/L                             PT                        12.6 second(s)  HI                             INR                       1.0                             PTT                       30.6 second(s)                             WBC                       8.6 x10(3)/mcL                             RBC                       6.07 x10(6)/mcL                             Hgb                       18.1 gm/dL  HI                             Hct                       51.7 %                             Platelet                  206 x10(3)/mcL                             MCV                       85 fL                             MCH                       30 pg                             MCHC                      35 gm/dL                             RDW                       12.3 %                              MPV                       9.5 fL                             Abs Neut                  5.2 x10(3)/mcL                             Neutro Auto               60 %                             Lymph Auto                29 %                             Mono Auto                 9 %                             Eos Auto                  1 %                             Abs Eos                   0.1 x10(3)/mcL                             Basophil Auto             1 %                             Abs Neutro                5.2 x10(3)/mcL                             Abs Lymph                 2.5 x10(3)/mcL                             Abs Mono                  0.8 x10(3)/mcL                             Abs Baso                  0.0 x10(3)/mcL                             IG%                       0.500 %  HI                             IG#                       0.0400  HI  ,    No qualifying data available.       Impression and Plan   Diagnosis   Cholecystitis (PAP02-YD K81.9)      Calls-Consults   -  5/11/2020 17:09:00 , Cely COLEMAN, Fanny GERARDO, recommends Get CT, Labs, Zosyn, Flagyl and admit..    Plan   Condition: Stable.    Disposition: Admit time  5/11/2020 18:01:00, Admit to Inpatient Unit.    Counseled: Patient.    Orders: Launch Orders   Admit/Transfer/Discharge:  Admit as Inpatient (Order): 5/11/2020 18:01 CDT, Medical Unit Cely COLEMAN, Fanny GERARDO, No, [facility] Cholecystitis, RUQ Pain..

## 2022-11-16 DIAGNOSIS — H54.7 DECREASED VISUAL ACUITY: Primary | ICD-10-CM

## 2022-12-16 ENCOUNTER — OFFICE VISIT (OUTPATIENT)
Dept: NEUROLOGY | Facility: CLINIC | Age: 47
End: 2022-12-16
Payer: COMMERCIAL

## 2022-12-16 VITALS
BODY MASS INDEX: 29.16 KG/M2 | WEIGHT: 220 LBS | SYSTOLIC BLOOD PRESSURE: 128 MMHG | HEIGHT: 73 IN | DIASTOLIC BLOOD PRESSURE: 92 MMHG

## 2022-12-16 DIAGNOSIS — H54.7 DECREASED VISUAL ACUITY: ICD-10-CM

## 2022-12-16 DIAGNOSIS — R90.82 WHITE MATTER ABNORMALITY ON MRI OF BRAIN: ICD-10-CM

## 2022-12-16 DIAGNOSIS — H57.9 VISUAL COMPLAINT: ICD-10-CM

## 2022-12-16 DIAGNOSIS — G43.109 MIGRAINE EQUIVALENT SYNDROME: ICD-10-CM

## 2022-12-16 PROCEDURE — 1160F PR REVIEW ALL MEDS BY PRESCRIBER/CLIN PHARMACIST DOCUMENTED: ICD-10-PCS | Mod: CPTII,S$GLB,, | Performed by: SPECIALIST

## 2022-12-16 PROCEDURE — 1159F PR MEDICATION LIST DOCUMENTED IN MEDICAL RECORD: ICD-10-PCS | Mod: CPTII,S$GLB,, | Performed by: SPECIALIST

## 2022-12-16 PROCEDURE — 3074F PR MOST RECENT SYSTOLIC BLOOD PRESSURE < 130 MM HG: ICD-10-PCS | Mod: CPTII,S$GLB,, | Performed by: SPECIALIST

## 2022-12-16 PROCEDURE — 99999 PR PBB SHADOW E&M-EST. PATIENT-LVL III: CPT | Mod: PBBFAC,,, | Performed by: SPECIALIST

## 2022-12-16 PROCEDURE — 3074F SYST BP LT 130 MM HG: CPT | Mod: CPTII,S$GLB,, | Performed by: SPECIALIST

## 2022-12-16 PROCEDURE — 3080F DIAST BP >= 90 MM HG: CPT | Mod: CPTII,S$GLB,, | Performed by: SPECIALIST

## 2022-12-16 PROCEDURE — 3008F BODY MASS INDEX DOCD: CPT | Mod: CPTII,S$GLB,, | Performed by: SPECIALIST

## 2022-12-16 PROCEDURE — 1159F MED LIST DOCD IN RCRD: CPT | Mod: CPTII,S$GLB,, | Performed by: SPECIALIST

## 2022-12-16 PROCEDURE — 99999 PR PBB SHADOW E&M-EST. PATIENT-LVL III: ICD-10-PCS | Mod: PBBFAC,,, | Performed by: SPECIALIST

## 2022-12-16 PROCEDURE — 3008F PR BODY MASS INDEX (BMI) DOCUMENTED: ICD-10-PCS | Mod: CPTII,S$GLB,, | Performed by: SPECIALIST

## 2022-12-16 PROCEDURE — 3080F PR MOST RECENT DIASTOLIC BLOOD PRESSURE >= 90 MM HG: ICD-10-PCS | Mod: CPTII,S$GLB,, | Performed by: SPECIALIST

## 2022-12-16 PROCEDURE — 99204 OFFICE O/P NEW MOD 45 MIN: CPT | Mod: S$GLB,,, | Performed by: SPECIALIST

## 2022-12-16 PROCEDURE — 1160F RVW MEDS BY RX/DR IN RCRD: CPT | Mod: CPTII,S$GLB,, | Performed by: SPECIALIST

## 2022-12-16 PROCEDURE — 99204 PR OFFICE/OUTPT VISIT, NEW, LEVL IV, 45-59 MIN: ICD-10-PCS | Mod: S$GLB,,, | Performed by: SPECIALIST

## 2022-12-16 RX ORDER — ALPRAZOLAM 0.5 MG
0.5 TABLET ORAL 2 TIMES DAILY PRN
COMMUNITY
Start: 2022-11-15

## 2022-12-16 RX ORDER — DEXTROAMPHETAMINE SULFATE, DEXTROAMPHETAMINE SACCHARATE, AMPHETAMINE ASPARTATE MONOHYDRATE, AND AMPHETAMINE SULFATE 12.5; 12.5; 12.5; 12.5 MG/1; MG/1; MG/1; MG/1
1 CAPSULE, EXTENDED RELEASE ORAL
Status: ON HOLD | COMMUNITY
Start: 2022-11-29 | End: 2024-02-26

## 2022-12-16 RX ORDER — TADALAFIL 5 MG/1
5 TABLET ORAL DAILY PRN
COMMUNITY

## 2022-12-16 RX ORDER — BUPROPION HYDROCHLORIDE 300 MG/1
300 TABLET ORAL DAILY
COMMUNITY
Start: 2022-11-01

## 2022-12-16 RX ORDER — CARIPRAZINE 1.5 MG/1
1.5 CAPSULE, GELATIN COATED ORAL
Status: ON HOLD | COMMUNITY
Start: 2022-10-31 | End: 2024-02-26

## 2022-12-16 NOTE — PROGRESS NOTES
"Subjective:       Patient ID: Raheel Knowles is a 47 y.o. male.    Chief Complaint: re eval for ongoing vis complaints and reportedly abnl brain MRI     HPI:            New pt for decreased visual acuity (Here for decreased visual acuity eval//Pt reports visual change onset 1 yr ago; saw optho Kim Villalpando, RP was suspected and had further testing done and ruled out. 5 month period where he was unable to see anything in color; currently color "not clear". Diff driving at night, headlights "completely blur vision". Unable to see in peripheral; constant pain behind eyes, no pain to blink. MRI Brain @ [facility] Feb 2022. Episodes when incr anxiety, dizziness, legs get numb, 2 spells anxiety; )    "Spells not as bad or not as often"   "Lump on scalp swells with spells"   Two to three times per day feels pain behind eyes     notes may also be on facesheet for HPI, ROS, and other sections     Review of Systems          Social History     Socioeconomic History    Marital status:    Tobacco Use    Smoking status: Every Day     Types: Cigarettes    Smokeless tobacco: Never   Substance and Sexual Activity    Alcohol use: Yes    Drug use: Never     ----------------------------  Depression      Current Outpatient Medications:     buPROPion (WELLBUTRIN SR) 150 MG TBSR 12 hr tablet, Take 150 mg by mouth 2 (two) times daily., Disp: , Rfl:     MYDAYIS 50 mg CT24, Take 1 capsule by mouth., Disp: , Rfl:     tadalafiL (CIALIS) 5 MG tablet, Take 5 mg by mouth daily as needed., Disp: , Rfl:     VRAYLAR 1.5 mg Cap, Take 1.5 mg by mouth., Disp: , Rfl:     XANAX 0.5 mg tablet, Take 0.5 mg by mouth 2 (two) times daily as needed., Disp: , Rfl:      Objective:        Exam:   BP (!) 128/92 (BP Location: Left arm, Patient Position: Sitting)   Ht 6' 1" (1.854 m)   Wt 99.8 kg (220 lb)   BMI 29.03 kg/m²     General Exam  __unaccompanied  if accompanied, by__ wife   body habitus_ Body mass index is 29.03 kg/m².    mental " status_alert and appropriate  oropharynx_Mallampati grade_  neck_  heart__  extremities_  skin_    Neurological:  cortical function__ seems normal     Speech __  normal   cranial nerves:  CN 2 VF_ok   fundi_ no papilledema   CN 3, 4, 6 EOMs_ok  CN 3, pupils_ok; in dim light or normal light without apd     CN 7_no lower face asymmetry  CN 8_hearing _ ok   CN 12 tongue_ok    Motor__ ok   tone:   muscle stretch reflexes__  Vib sens_  Pin sens_  plantars__  tremor: _  coordination: _  gait_ normal incl tandem toes and heels   Romberg:     Neuroimaging:  Images and imaging reports reviewed.  Study / studies:   Rads summary:1. Mild generalized cerebral and cerebellar atrophy  2. A few small pinpoint foci of abnormal signal intensity in the  periventricular and cerebral white matter suspicious for a few  pinpoint foci of ischemia versus gliosis  Johan COLEMAN, León Zarate, radiologist  My comments: a few deep small uniform nonspecific WMH's; brain volume ok in my view       Labs:      _+/-_ new patient  [last  seen 4 yr ago]   _...__ multiple issues/ diagnoses or problems  [if not enumerated in note then discussed in encounter but not documented]    complexity of data     _._high _mod   _._ images and reports reviewed:  _._ hx obtained from family or accompaniment:   __other studies reviewed   __studies ordered __   __studies considered or discussed but not ordered __  _._DDx discussed __    Risks    __high _mod   __ (possible or definite) neurodegenerative condition and inherent progression  __ (poss or def) autoimmune condition with possibility of flares or unexpected attack  __ (poss or def) seiz d.o. with possib of recurr seiz's   __ cerebrovasc ds with risk of recurrence of stroke  __ CNS meds (and/or) potentially high risk non CNS meds which may cause medical or behavioral side effects  __ fall risk  __ driving discussed   _._ diagnosis unclear or DDx wide making risk uncertain to high  __other:    MDM/Medical Decision  Making     __high  _.moderate         Assessment/Plan:       Problem List Items Addressed This Visit          Neuro    White matter abnormality on MRI of brain;  not suggestive of MS in my view   not worrisome to me     Migraine equivalent syndrome: possible; I don't feel compelled nor am I interested in Rx any addn meds     Visual complaint              Other comments/ follow up:        Hopeful reassurance      additional opth opinion could be sought but  ask that such request come from his 1' since I am not planning to follow him     Followup __ not sheduled at this time     MD TAMI JimenezA

## 2023-01-15 ENCOUNTER — HOSPITAL ENCOUNTER (EMERGENCY)
Facility: HOSPITAL | Age: 48
Discharge: HOME | End: 2023-01-15
Attending: GENERAL PRACTICE
Payer: COMMERCIAL

## 2023-01-15 VITALS
DIASTOLIC BLOOD PRESSURE: 89 MMHG | BODY MASS INDEX: 28.88 KG/M2 | RESPIRATION RATE: 14 BRPM | SYSTOLIC BLOOD PRESSURE: 148 MMHG | TEMPERATURE: 98 F | HEART RATE: 91 BPM | OXYGEN SATURATION: 94 % | HEIGHT: 74 IN | WEIGHT: 225 LBS

## 2023-01-15 DIAGNOSIS — R07.9 CHEST PAIN: ICD-10-CM

## 2023-01-15 DIAGNOSIS — I10 PRIMARY HYPERTENSION: ICD-10-CM

## 2023-01-15 DIAGNOSIS — R07.89 OTHER CHEST PAIN: Primary | ICD-10-CM

## 2023-01-15 LAB
ALBUMIN SERPL-MCNC: 4 G/DL (ref 3.5–5)
ALBUMIN/GLOB SERPL: 0.8 RATIO (ref 1.1–2)
ALP SERPL-CCNC: 93 UNIT/L (ref 40–150)
ALT SERPL-CCNC: 64 UNIT/L (ref 0–55)
AMPHET UR QL SCN: POSITIVE
AST SERPL-CCNC: 43 UNIT/L (ref 5–34)
BARBITURATE SCN PRESENT UR: NEGATIVE
BASOPHILS # BLD AUTO: 0.09 X10(3)/MCL (ref 0–0.2)
BASOPHILS NFR BLD AUTO: 0.9 %
BENZODIAZ UR QL SCN: POSITIVE
BILIRUBIN DIRECT+TOT PNL SERPL-MCNC: 1.1 MG/DL
BNP BLD-MCNC: <10 PG/ML
BUN SERPL-MCNC: 15 MG/DL (ref 8.9–20.6)
CALCIUM SERPL-MCNC: 9.9 MG/DL (ref 8.4–10.2)
CANNABINOIDS UR QL SCN: NEGATIVE
CHLORIDE SERPL-SCNC: 106 MMOL/L (ref 98–107)
CK MB SERPL-MCNC: 1 NG/ML
CK SERPL-CCNC: 79 U/L (ref 30–200)
CO2 SERPL-SCNC: 19 MMOL/L (ref 22–29)
COCAINE UR QL SCN: NEGATIVE
CREAT SERPL-MCNC: 1.67 MG/DL (ref 0.73–1.18)
EOSINOPHIL # BLD AUTO: 0.14 X10(3)/MCL (ref 0–0.9)
EOSINOPHIL NFR BLD AUTO: 1.4 %
ERYTHROCYTE [DISTWIDTH] IN BLOOD BY AUTOMATED COUNT: 13.3 % (ref 11.5–17)
FENTANYL UR QL SCN: NEGATIVE
FLUAV AG UPPER RESP QL IA.RAPID: NOT DETECTED
FLUBV AG UPPER RESP QL IA.RAPID: NOT DETECTED
GFR SERPLBLD CREATININE-BSD FMLA CKD-EPI: 51 MLS/MIN/1.73/M2
GLOBULIN SER-MCNC: 4.8 GM/DL (ref 2.4–3.5)
GLUCOSE SERPL-MCNC: 96 MG/DL (ref 74–100)
HCT VFR BLD AUTO: 53 % (ref 42–52)
HGB BLD-MCNC: 19.1 GM/DL (ref 14–18)
IMM GRANULOCYTES # BLD AUTO: 0.09 X10(3)/MCL (ref 0–0.04)
IMM GRANULOCYTES NFR BLD AUTO: 0.9 %
LYMPHOCYTES # BLD AUTO: 2.62 X10(3)/MCL (ref 0.6–4.6)
LYMPHOCYTES NFR BLD AUTO: 25.4 %
MAGNESIUM SERPL-MCNC: 2 MG/DL (ref 1.6–2.6)
MCH RBC QN AUTO: 30.1 PG
MCHC RBC AUTO-ENTMCNC: 36 MG/DL (ref 33–36)
MCV RBC AUTO: 83.6 FL (ref 80–94)
MDMA UR QL SCN: NEGATIVE
MONOCYTES # BLD AUTO: 0.96 X10(3)/MCL (ref 0.1–1.3)
MONOCYTES NFR BLD AUTO: 9.3 %
NEUTROPHILS # BLD AUTO: 6.4 X10(3)/MCL (ref 2.1–9.2)
NEUTROPHILS NFR BLD AUTO: 62.1 %
NRBC BLD AUTO-RTO: 0 %
OPIATES UR QL SCN: NEGATIVE
PCP UR QL: NEGATIVE
PH UR: 6.5 [PH] (ref 3–11)
PLATELET # BLD AUTO: 263 X10(3)/MCL (ref 130–400)
PMV BLD AUTO: 9.6 FL (ref 7.4–10.4)
POTASSIUM SERPL-SCNC: 4.6 MMOL/L (ref 3.5–5.1)
PROT SERPL-MCNC: 8.8 GM/DL (ref 6.4–8.3)
RBC # BLD AUTO: 6.34 X10(6)/MCL (ref 4.7–6.1)
SARS-COV-2 RNA RESP QL NAA+PROBE: NOT DETECTED
SODIUM SERPL-SCNC: 138 MMOL/L (ref 136–145)
TROPONIN I SERPL-MCNC: <0.01 NG/ML (ref 0–0.04)
TSH SERPL-ACNC: 1.5 UIU/ML (ref 0.35–4.94)
WBC # SPEC AUTO: 10.3 X10(3)/MCL (ref 4.5–11.5)

## 2023-01-15 PROCEDURE — 82553 CREATINE MB FRACTION: CPT | Performed by: GENERAL PRACTICE

## 2023-01-15 PROCEDURE — 83735 ASSAY OF MAGNESIUM: CPT | Performed by: GENERAL PRACTICE

## 2023-01-15 PROCEDURE — 25000003 PHARM REV CODE 250: Performed by: GENERAL PRACTICE

## 2023-01-15 PROCEDURE — 99285 EMERGENCY DEPT VISIT HI MDM: CPT | Mod: 25

## 2023-01-15 PROCEDURE — 85025 COMPLETE CBC W/AUTO DIFF WBC: CPT | Performed by: GENERAL PRACTICE

## 2023-01-15 PROCEDURE — 82550 ASSAY OF CK (CPK): CPT | Performed by: GENERAL PRACTICE

## 2023-01-15 PROCEDURE — 84484 ASSAY OF TROPONIN QUANT: CPT | Performed by: GENERAL PRACTICE

## 2023-01-15 PROCEDURE — 93005 ELECTROCARDIOGRAM TRACING: CPT

## 2023-01-15 PROCEDURE — 84443 ASSAY THYROID STIM HORMONE: CPT | Performed by: GENERAL PRACTICE

## 2023-01-15 PROCEDURE — 0240U COVID/FLU A&B PCR: CPT | Performed by: GENERAL PRACTICE

## 2023-01-15 PROCEDURE — 83880 ASSAY OF NATRIURETIC PEPTIDE: CPT | Performed by: GENERAL PRACTICE

## 2023-01-15 PROCEDURE — 80053 COMPREHEN METABOLIC PANEL: CPT | Performed by: GENERAL PRACTICE

## 2023-01-15 PROCEDURE — 99900031 HC PATIENT EDUCATION (STAT)

## 2023-01-15 PROCEDURE — 80307 DRUG TEST PRSMV CHEM ANLYZR: CPT | Performed by: GENERAL PRACTICE

## 2023-01-15 RX ORDER — ASPIRIN 325 MG
325 TABLET ORAL
Status: DISCONTINUED | OUTPATIENT
Start: 2023-01-15 | End: 2023-01-15 | Stop reason: HOSPADM

## 2023-01-15 RX ADMIN — NITROGLYCERIN 1 INCH: 20 OINTMENT TOPICAL at 05:01

## 2023-01-15 NOTE — ED PROVIDER NOTES
Encounter Date: 1/15/2023       History     Chief Complaint   Patient presents with    Chest Pain     CP since last night.  Feels like something is siting on his chest.       CP since last night.  Feels like something is siting on his chest.      The history is provided by the patient.   Chest Pain  The current episode started yesterday. Duration of episode(s) is 1 day. Chest pain occurs rarely. The chest pain is unchanged. At its most intense, the chest pain is at 5/10. The chest pain is currently at 5/10. The quality of the pain is described as tightness, dull and burning. Primary symptoms include a fever. The fever began yesterday. The fever has been unchanged since its onset. The fever has been present for Less than 1 day. The temperature was taken by no thermometer. The maximum temperature recorded prior to his arrival was unknown. He tried nothing for the symptoms. Risk factors include obesity, stress and smoking/tobacco exposure.   His past medical history is significant for hypertension.   Review of patient's allergies indicates:  No Known Allergies  Past Medical History:   Diagnosis Date    Depression      History reviewed. No pertinent surgical history.  Family History   Family history unknown: Yes     Social History     Tobacco Use    Smoking status: Every Day     Types: Cigarettes    Smokeless tobacco: Never   Substance Use Topics    Alcohol use: Yes    Drug use: Never     Review of Systems   Constitutional:  Positive for fever.   HENT: Negative.     Eyes: Negative.    Respiratory: Negative.     Cardiovascular:  Positive for chest pain.   Gastrointestinal: Negative.    Endocrine: Negative.    Genitourinary: Negative.    Musculoskeletal: Negative.    Skin: Negative.    Allergic/Immunologic: Negative.    Neurological: Negative.    Hematological: Negative.    Psychiatric/Behavioral: Negative.     All other systems reviewed and are negative.    Physical Exam     Initial Vitals [01/15/23 1632]   BP Pulse Resp  Temp SpO2   (!) 177/103 88 18 98.1 °F (36.7 °C) 97 %      MAP       --         Physical Exam    Nursing note and vitals reviewed.  Constitutional: He appears well-developed and well-nourished.   HENT:   Head: Normocephalic and atraumatic.   Eyes: EOM are normal. Pupils are equal, round, and reactive to light.   Neck: Neck supple.   Normal range of motion.  Cardiovascular:  Normal rate, regular rhythm, normal heart sounds and intact distal pulses.           Pulmonary/Chest: Breath sounds normal.   Abdominal: Abdomen is soft. Bowel sounds are normal.   Musculoskeletal:         General: Normal range of motion.      Cervical back: Normal range of motion and neck supple.     Neurological: He is alert and oriented to person, place, and time. He has normal strength. GCS score is 15. GCS eye subscore is 4. GCS verbal subscore is 5. GCS motor subscore is 6.   Skin: Skin is warm and dry.   Psychiatric: He has a normal mood and affect. His behavior is normal. Judgment and thought content normal.       ED Course   Procedures  Labs Reviewed   COMPREHENSIVE METABOLIC PANEL - Abnormal; Notable for the following components:       Result Value    Carbon Dioxide 19 (*)     Creatinine 1.67 (*)     Protein Total 8.8 (*)     Globulin 4.8 (*)     Albumin/Globulin Ratio 0.8 (*)     Alanine Aminotransferase 64 (*)     Aspartate Aminotransferase 43 (*)     All other components within normal limits   DRUG SCREEN, URINE (BEAKER) - Abnormal; Notable for the following components:    Amphetamines, Urine Positive (*)     Benzodiazepine, Urine Positive (*)     All other components within normal limits    Narrative:     Cut off concentrations:    Amphetamines - 1000 ng/ml  Barbiturates - 200 ng/ml  Benzodiazepine - 200 ng/ml  Cannabinoids (THC) - 50 ng/ml  Cocaine - 300 ng/ml  Fentanyl - 1.0 ng/ml  MDMA - 500 ng/ml  Opiates - 300 ng/ml   Phencyclidine (PCP) - 25 ng/ml    Specimen submitted for drug analysis and tested for pH and specific gravity in  order to evaluate sample integrity. Suspect tampering if specific gravity is <1.003 and/or pH is not within the range of 4.5 - 8.0  False negatives may result form substances such as bleach added to urine.  False positives may result for the presence of a substance with similar chemical structure to the drug or its metabolite.    This test provides only a PRELIMINARY analytical test result. A more specific alternate chemical method must be used in order to obtain a confirmed analytical result. Gas chromatography/mass spectrometry (GC/MS) is the preferred confirmatory method. Other chemical confirmation methods are available. Clinical consideration and professional judgement should be applied to any drug of abuse test result, particularly when preliminary positive results are used.    Positive results will be confirmed only at the physicians request. Unconfirmed screening results are to be used only for medical purposes (treatment).        CBC WITH DIFFERENTIAL - Abnormal; Notable for the following components:    RBC 6.34 (*)     Hgb 19.1 (*)     Hct 53.0 (*)     IG# 0.09 (*)     All other components within normal limits   TSH - Normal   CK - Normal   CK-MB - Normal   TROPONIN I - Normal   B-TYPE NATRIURETIC PEPTIDE - Normal   MAGNESIUM - Normal   COVID/FLU A&B PCR - Normal    Narrative:     The Xpert Xpress SARS-CoV-2/FLU/RSV plus is a rapid, multiplexed real-time PCR test intended for the simultaneous qualitative detection and differentiation of SARS-CoV-2, Influenza A, Influenza B, and respiratory syncytial virus (RSV) viral RNA in either nasopharyngeal swab or nasal swab specimens.         CBC W/ AUTO DIFFERENTIAL    Narrative:     The following orders were created for panel order CBC auto differential.  Procedure                               Abnormality         Status                     ---------                               -----------         ------                     CBC with Differential[[id number]]         Abnormal            Final result                 Please view results for these tests on the individual orders.     EKG Readings: (Independently Interpreted)   Rhythm: Normal Sinus Rhythm. Ectopy: No Ectopy. Conduction: RBBB (Incomplete right bundle-branch block). Axis: Normal. Clinical Impression: Normal Sinus Rhythm     Imaging Results              X-Ray Chest PA And Lateral (Final result)  Result time 01/15/23 17:02:01      Final result by Juan Alberto Solano MD (01/15/23 17:02:01)                   Impression:      No acute cardiopulmonary process.      Electronically signed by: Juan Alberto Solano  Date:    01/15/2023  Time:    17:02               Narrative:    EXAMINATION:  XR CHEST PA AND LATERAL    CLINICAL HISTORY:  Chest Pain;    TECHNIQUE:  Two views of the chest    COMPARISON:  05/11/2020    FINDINGS:  No focal opacification, pleural effusion, or pneumothorax.    The cardiomediastinal silhouette is within normal limits.    No acute osseous abnormality.                                       Medications   aspirin tablet 325 mg (325 mg Oral Not Given 1/15/23 1705)   nitroGLYCERIN 2% TD oint ointment 1 inch (1 inch Topical (Top) Given 1/15/23 1706)     Medical Decision Making:   Clinical Tests:   Lab Tests: Ordered and Reviewed  The following lab test(s) were unremarkable: Troponin, CBC and CMP  Radiological Study: Ordered and Reviewed  Medical Tests: Reviewed and Ordered  Cardiac workup is unremarkable.  I would a long discussion with the patient about smoking cessation and possibly abstaining from the ADHD medicine.  It is possible that the amphetamines he is on might be causing his episodes of chest pain.  I also advised the patient follow-up with his primary care physician for referral to a cardiologist for stress test.                        Clinical Impression:   Final diagnoses:  [R07.9] Chest pain  [R07.89] Other chest pain (Primary)  [I10] Primary hypertension        ED Disposition Condition    Discharge  Stable          ED Prescriptions    None       Follow-up Information       Follow up With Specialties Details Why Contact Info    Frederick Hernandez MD Internal Medicine Schedule an appointment as soon as possible for a visit in 2 days  [address] [place] [address]  [phone]               Jessee Robison MD  01/15/23 0661

## 2023-01-16 NOTE — ED NOTES
Pt sent to Ed by Urgent Care. Pt c/o SOB/CP since last night. Pt Reports SOB worse than CP. Has hx of anxiety, appears anxious. Sts that yesterday he took one of his xanax which gave him some relief. VSS. Pt able to ambulate to room with no assistance. Aaox4,

## 2023-02-15 ENCOUNTER — LAB VISIT (OUTPATIENT)
Dept: LAB | Facility: HOSPITAL | Age: 48
End: 2023-02-15
Attending: NURSE PRACTITIONER
Payer: COMMERCIAL

## 2023-02-15 DIAGNOSIS — E29.1 TESTICULAR HYPOFUNCTION: ICD-10-CM

## 2023-02-15 DIAGNOSIS — R53.83 FATIGUE, UNSPECIFIED TYPE: Primary | ICD-10-CM

## 2023-02-15 DIAGNOSIS — N42.9 DISEASE OF PROSTATE: ICD-10-CM

## 2023-02-15 DIAGNOSIS — N13.8 BPH WITH URINARY OBSTRUCTION: ICD-10-CM

## 2023-02-15 DIAGNOSIS — N42.9 DISORDER OF PROSTATE: ICD-10-CM

## 2023-02-15 DIAGNOSIS — N40.1 BPH WITH URINARY OBSTRUCTION: ICD-10-CM

## 2023-02-15 LAB
BASOPHILS # BLD AUTO: 0.08 X10(3)/MCL (ref 0–0.2)
BASOPHILS NFR BLD AUTO: 0.9 %
EOSINOPHIL # BLD AUTO: 0.15 X10(3)/MCL (ref 0–0.9)
EOSINOPHIL NFR BLD AUTO: 1.6 %
ERYTHROCYTE [DISTWIDTH] IN BLOOD BY AUTOMATED COUNT: 13.3 % (ref 11.5–17)
ESTRADIOL SERPL HS-MCNC: 37 PG/ML
HCT VFR BLD AUTO: 56 % (ref 42–52)
HGB BLD-MCNC: 19.4 GM/DL (ref 14–18)
IMM GRANULOCYTES # BLD AUTO: 0.08 X10(3)/MCL (ref 0–0.04)
IMM GRANULOCYTES NFR BLD AUTO: 0.9 %
LYMPHOCYTES # BLD AUTO: 2.21 X10(3)/MCL (ref 0.6–4.6)
LYMPHOCYTES NFR BLD AUTO: 24.2 %
MCH RBC QN AUTO: 29.8 PG
MCHC RBC AUTO-ENTMCNC: 34.6 MG/DL (ref 33–36)
MCV RBC AUTO: 85.9 FL (ref 80–94)
MONOCYTES # BLD AUTO: 0.54 X10(3)/MCL (ref 0.1–1.3)
MONOCYTES NFR BLD AUTO: 5.9 %
NEUTROPHILS # BLD AUTO: 6.07 X10(3)/MCL (ref 2.1–9.2)
NEUTROPHILS NFR BLD AUTO: 66.5 %
NRBC BLD AUTO-RTO: 0 %
PLATELET # BLD AUTO: 259 X10(3)/MCL (ref 130–400)
PMV BLD AUTO: 9.6 FL (ref 7.4–10.4)
PSA SERPL-MCNC: 5.11 NG/ML
RBC # BLD AUTO: 6.52 X10(6)/MCL (ref 4.7–6.1)
WBC # SPEC AUTO: 9.1 X10(3)/MCL (ref 4.5–11.5)

## 2023-02-15 PROCEDURE — 84402 ASSAY OF FREE TESTOSTERONE: CPT

## 2023-02-15 PROCEDURE — 36415 COLL VENOUS BLD VENIPUNCTURE: CPT

## 2023-02-15 PROCEDURE — 85025 COMPLETE CBC W/AUTO DIFF WBC: CPT

## 2023-02-15 PROCEDURE — 82670 ASSAY OF TOTAL ESTRADIOL: CPT

## 2023-02-15 PROCEDURE — 84153 ASSAY OF PSA TOTAL: CPT

## 2023-02-15 PROCEDURE — 84270 ASSAY OF SEX HORMONE GLOBUL: CPT

## 2023-02-16 LAB — SHBG SERPL-SCNC: 12.9 NMOL/L (ref 13.3–89.5)

## 2023-02-27 LAB
TESTOST FREE SERPL-MCNC: 53.1 NG/DL (ref 4.26–16.4)
TESTOST SERPL-MCNC: 1340 NG/DL (ref 240–950)

## 2024-02-26 ENCOUNTER — HOSPITAL ENCOUNTER (OUTPATIENT)
Facility: HOSPITAL | Age: 49
Discharge: HOME | End: 2024-02-27
Attending: STUDENT IN AN ORGANIZED HEALTH CARE EDUCATION/TRAINING PROGRAM | Admitting: INTERNAL MEDICINE
Payer: COMMERCIAL

## 2024-02-26 DIAGNOSIS — R07.9 CHEST PAIN: ICD-10-CM

## 2024-02-26 DIAGNOSIS — R07.9 CHEST PAIN OF UNKNOWN ETIOLOGY: Primary | ICD-10-CM

## 2024-02-26 PROBLEM — I10 PRIMARY HYPERTENSION: Status: ACTIVE | Noted: 2024-02-26

## 2024-02-26 PROBLEM — R07.1 CHEST PAIN ON BREATHING: Status: ACTIVE | Noted: 2024-02-26

## 2024-02-26 PROBLEM — Z72.0 TOBACCO ABUSE: Status: ACTIVE | Noted: 2024-02-26

## 2024-02-26 LAB
ANION GAP SERPL CALC-SCNC: 13 MEQ/L
BASOPHILS # BLD AUTO: 0.1 X10(3)/MCL
BASOPHILS NFR BLD AUTO: 1.1 %
BUN SERPL-MCNC: 14 MG/DL (ref 8.9–20.6)
CALCIUM SERPL-MCNC: 9.8 MG/DL (ref 8.4–10.2)
CHLORIDE SERPL-SCNC: 106 MMOL/L (ref 98–107)
CO2 SERPL-SCNC: 22 MMOL/L (ref 22–29)
CREAT SERPL-MCNC: 1.19 MG/DL (ref 0.73–1.18)
CREAT/UREA NIT SERPL: 12
D DIMER PPP IA.FEU-MCNC: 0.28 UG/ML FEU (ref 0.19–0.59)
EOSINOPHIL # BLD AUTO: 0.12 X10(3)/MCL (ref 0–0.9)
EOSINOPHIL NFR BLD AUTO: 1.3 %
ERYTHROCYTE [DISTWIDTH] IN BLOOD BY AUTOMATED COUNT: 13.5 % (ref 11.5–17)
FLUAV AG UPPER RESP QL IA.RAPID: NOT DETECTED
FLUBV AG UPPER RESP QL IA.RAPID: NOT DETECTED
GFR SERPLBLD CREATININE-BSD FMLA CKD-EPI: >60 MLS/MIN/1.73/M2
GLUCOSE SERPL-MCNC: 77 MG/DL (ref 74–100)
HCT VFR BLD AUTO: 47.8 % (ref 42–52)
HGB BLD-MCNC: 16.8 G/DL (ref 14–18)
IMM GRANULOCYTES # BLD AUTO: 0.13 X10(3)/MCL (ref 0–0.04)
IMM GRANULOCYTES NFR BLD AUTO: 1.4 %
LYMPHOCYTES # BLD AUTO: 2.71 X10(3)/MCL (ref 0.6–4.6)
LYMPHOCYTES NFR BLD AUTO: 29.9 %
MCH RBC QN AUTO: 30.6 PG (ref 27–31)
MCHC RBC AUTO-ENTMCNC: 35.1 G/DL (ref 33–36)
MCV RBC AUTO: 87.1 FL (ref 80–94)
MONOCYTES # BLD AUTO: 0.68 X10(3)/MCL (ref 0.1–1.3)
MONOCYTES NFR BLD AUTO: 7.5 %
NEUTROPHILS # BLD AUTO: 5.32 X10(3)/MCL (ref 2.1–9.2)
NEUTROPHILS NFR BLD AUTO: 58.8 %
NRBC BLD AUTO-RTO: 0 %
PLATELET # BLD AUTO: 240 X10(3)/MCL (ref 130–400)
PMV BLD AUTO: 9.4 FL (ref 7.4–10.4)
POTASSIUM SERPL-SCNC: 3.8 MMOL/L (ref 3.5–5.1)
RBC # BLD AUTO: 5.49 X10(6)/MCL (ref 4.7–6.1)
SARS-COV-2 RNA RESP QL NAA+PROBE: NOT DETECTED
SODIUM SERPL-SCNC: 141 MMOL/L (ref 136–145)
TROPONIN I SERPL-MCNC: <0.01 NG/ML (ref 0–0.04)
WBC # SPEC AUTO: 9.06 X10(3)/MCL (ref 4.5–11.5)

## 2024-02-26 PROCEDURE — 80048 BASIC METABOLIC PNL TOTAL CA: CPT | Performed by: STUDENT IN AN ORGANIZED HEALTH CARE EDUCATION/TRAINING PROGRAM

## 2024-02-26 PROCEDURE — 25000003 PHARM REV CODE 250: Performed by: STUDENT IN AN ORGANIZED HEALTH CARE EDUCATION/TRAINING PROGRAM

## 2024-02-26 PROCEDURE — G0378 HOSPITAL OBSERVATION PER HR: HCPCS

## 2024-02-26 PROCEDURE — 93010 ELECTROCARDIOGRAM REPORT: CPT | Mod: ,,, | Performed by: INTERNAL MEDICINE

## 2024-02-26 PROCEDURE — 84484 ASSAY OF TROPONIN QUANT: CPT | Performed by: STUDENT IN AN ORGANIZED HEALTH CARE EDUCATION/TRAINING PROGRAM

## 2024-02-26 PROCEDURE — 99285 EMERGENCY DEPT VISIT HI MDM: CPT | Mod: 25

## 2024-02-26 PROCEDURE — 63600175 PHARM REV CODE 636 W HCPCS: Performed by: STUDENT IN AN ORGANIZED HEALTH CARE EDUCATION/TRAINING PROGRAM

## 2024-02-26 PROCEDURE — 25000242 PHARM REV CODE 250 ALT 637 W/ HCPCS: Performed by: STUDENT IN AN ORGANIZED HEALTH CARE EDUCATION/TRAINING PROGRAM

## 2024-02-26 PROCEDURE — 0240U COVID/FLU A&B PCR: CPT | Performed by: STUDENT IN AN ORGANIZED HEALTH CARE EDUCATION/TRAINING PROGRAM

## 2024-02-26 PROCEDURE — 25000003 PHARM REV CODE 250: Performed by: INTERNAL MEDICINE

## 2024-02-26 PROCEDURE — 25500020 PHARM REV CODE 255: Performed by: STUDENT IN AN ORGANIZED HEALTH CARE EDUCATION/TRAINING PROGRAM

## 2024-02-26 PROCEDURE — 94761 N-INVAS EAR/PLS OXIMETRY MLT: CPT

## 2024-02-26 PROCEDURE — 85379 FIBRIN DEGRADATION QUANT: CPT | Performed by: STUDENT IN AN ORGANIZED HEALTH CARE EDUCATION/TRAINING PROGRAM

## 2024-02-26 PROCEDURE — 85025 COMPLETE CBC W/AUTO DIFF WBC: CPT | Performed by: STUDENT IN AN ORGANIZED HEALTH CARE EDUCATION/TRAINING PROGRAM

## 2024-02-26 PROCEDURE — 96374 THER/PROPH/DIAG INJ IV PUSH: CPT

## 2024-02-26 PROCEDURE — 93005 ELECTROCARDIOGRAM TRACING: CPT

## 2024-02-26 RX ORDER — DULOXETIN HYDROCHLORIDE 20 MG/1
20 CAPSULE, DELAYED RELEASE ORAL DAILY
Status: DISCONTINUED | OUTPATIENT
Start: 2024-02-27 | End: 2024-02-27 | Stop reason: HOSPADM

## 2024-02-26 RX ORDER — TAMSULOSIN HYDROCHLORIDE 0.4 MG/1
0.4 CAPSULE ORAL DAILY
COMMUNITY

## 2024-02-26 RX ORDER — NITROGLYCERIN 0.4 MG/1
0.4 TABLET SUBLINGUAL
Status: COMPLETED | OUTPATIENT
Start: 2024-02-26 | End: 2024-02-26

## 2024-02-26 RX ORDER — SODIUM CHLORIDE 0.9 % (FLUSH) 0.9 %
10 SYRINGE (ML) INJECTION
Status: DISCONTINUED | OUTPATIENT
Start: 2024-02-26 | End: 2024-02-27 | Stop reason: HOSPADM

## 2024-02-26 RX ORDER — HYDROXYZINE PAMOATE 25 MG/1
25 CAPSULE ORAL 3 TIMES DAILY PRN
COMMUNITY
Start: 2024-01-12

## 2024-02-26 RX ORDER — QUETIAPINE FUMARATE 25 MG/1
25 TABLET, FILM COATED ORAL DAILY
Status: DISCONTINUED | OUTPATIENT
Start: 2024-02-27 | End: 2024-02-26

## 2024-02-26 RX ORDER — HYDROXYZINE PAMOATE 25 MG/1
25 CAPSULE ORAL EVERY 6 HOURS PRN
Status: DISCONTINUED | OUTPATIENT
Start: 2024-02-26 | End: 2024-02-27 | Stop reason: HOSPADM

## 2024-02-26 RX ORDER — BUPROPION HYDROCHLORIDE 300 MG/1
300 TABLET ORAL DAILY
Status: DISCONTINUED | OUTPATIENT
Start: 2024-02-27 | End: 2024-02-27 | Stop reason: HOSPADM

## 2024-02-26 RX ORDER — DULOXETIN HYDROCHLORIDE 20 MG/1
20 CAPSULE, DELAYED RELEASE ORAL DAILY
COMMUNITY

## 2024-02-26 RX ORDER — LISINOPRIL 5 MG/1
10 TABLET ORAL DAILY
Status: DISCONTINUED | OUTPATIENT
Start: 2024-02-26 | End: 2024-02-27 | Stop reason: HOSPADM

## 2024-02-26 RX ORDER — QUETIAPINE FUMARATE 25 MG/1
25 TABLET, FILM COATED ORAL NIGHTLY
Status: DISCONTINUED | OUTPATIENT
Start: 2024-02-26 | End: 2024-02-27 | Stop reason: HOSPADM

## 2024-02-26 RX ORDER — BUPROPION HYDROCHLORIDE 150 MG/1
150 TABLET, EXTENDED RELEASE ORAL 2 TIMES DAILY
Status: DISCONTINUED | OUTPATIENT
Start: 2024-02-26 | End: 2024-02-26

## 2024-02-26 RX ORDER — QUETIAPINE FUMARATE 25 MG/1
25 TABLET, FILM COATED ORAL NIGHTLY
COMMUNITY

## 2024-02-26 RX ORDER — TAMSULOSIN HYDROCHLORIDE 0.4 MG/1
0.4 CAPSULE ORAL DAILY
Status: DISCONTINUED | OUTPATIENT
Start: 2024-02-27 | End: 2024-02-27 | Stop reason: HOSPADM

## 2024-02-26 RX ORDER — NAPROXEN SODIUM 220 MG/1
81 TABLET, FILM COATED ORAL DAILY
Status: DISCONTINUED | OUTPATIENT
Start: 2024-02-26 | End: 2024-02-27 | Stop reason: HOSPADM

## 2024-02-26 RX ORDER — ALPRAZOLAM 0.5 MG/1
0.5 TABLET ORAL 2 TIMES DAILY PRN
Status: DISCONTINUED | OUTPATIENT
Start: 2024-02-26 | End: 2024-02-27 | Stop reason: HOSPADM

## 2024-02-26 RX ORDER — NAPROXEN SODIUM 220 MG/1
324 TABLET, FILM COATED ORAL
Status: COMPLETED | OUTPATIENT
Start: 2024-02-26 | End: 2024-02-26

## 2024-02-26 RX ORDER — TRAMADOL HYDROCHLORIDE 50 MG/1
50 TABLET ORAL EVERY 6 HOURS PRN
Status: DISCONTINUED | OUTPATIENT
Start: 2024-02-26 | End: 2024-02-27 | Stop reason: HOSPADM

## 2024-02-26 RX ADMIN — NITROGLYCERIN 0.4 MG: 0.4 TABLET SUBLINGUAL at 10:02

## 2024-02-26 RX ADMIN — LISINOPRIL 10 MG: 5 TABLET ORAL at 03:02

## 2024-02-26 RX ADMIN — NITROGLYCERIN 0.5 INCH: 20 OINTMENT TOPICAL at 12:02

## 2024-02-26 RX ADMIN — QUETIAPINE FUMARATE 25 MG: 25 TABLET ORAL at 08:02

## 2024-02-26 RX ADMIN — LORAZEPAM 1 MG: 2 INJECTION INTRAMUSCULAR; INTRAVENOUS at 10:02

## 2024-02-26 RX ADMIN — TRAMADOL HYDROCHLORIDE 50 MG: 50 TABLET, COATED ORAL at 03:02

## 2024-02-26 RX ADMIN — IOPAMIDOL 100 ML: 755 INJECTION, SOLUTION INTRAVENOUS at 11:02

## 2024-02-26 RX ADMIN — ASPIRIN 81 MG 324 MG: 81 TABLET ORAL at 10:02

## 2024-02-26 NOTE — NURSING
Removed NTG paste from Left chest wall per Dr Easley instruction who is present for admit assessment.

## 2024-02-26 NOTE — H&P
[facility] - Medical Surgical Unit  [facility] Medicine  History & Physical    Patient Name: Raheel Knowles  MRN: [id number]  Patient Class: OP- Observation  Admission Date: 2024  Attending Physician: Evangelista Sorto MD   Primary Care Provider: Frederick Hernandez MD         Patient information was obtained from patient and ER records.     Subjective:     Principal Problem:Chest pain on breathing    Chief Complaint:   Chief Complaint   Patient presents with    Chest Pain     Chest pain started 5 hours today while driving, hurts worse when he takes a deep breathe describes pain as tightness. LOP 8. C/o some nausea, denies any coughing.         HPI: 49 yo male presents to the ED c/o chest pain with radiation to his left shoulder with associated nausea and SOB.  It started about 5 hours prior to presentation while he was driving.  His BP was accelerated at 162/100.  He was given NTG with improvement of the pain and BP.  2 sets of troponin have been negative thus far.  No numbness or diaphoresis associated with the pain.  Chest pain was not reproducible.  He does smoke tobacco and has some anxiety.  CTA of chest was performed and was negative.  He will be admitted for serial cardiac enzymes and monitoring.    Past Medical History:   Diagnosis Date    Anxiety disorder, unspecified     Depression        History reviewed. No pertinent surgical history.    Review of patient's allergies indicates:  No Known Allergies    No current facility-administered medications on file prior to encounter.     Current Outpatient Medications on File Prior to Encounter   Medication Sig    hydrOXYzine pamoate (VISTARIL) 25 MG Cap SMARTSI Milligram(s) Gastro Tube Every 6 Hours PRN    buPROPion (WELLBUTRIN SR) 150 MG TBSR 12 hr tablet Take 150 mg by mouth 2 (two) times daily.    MYDAYIS 50 mg CT24 Take 1 capsule by mouth.    tadalafiL (CIALIS) 5 MG tablet Take 5 mg by mouth daily as needed.    VRAYLAR 1.5 mg Cap Take  1.5 mg by mouth.    XANAX 0.5 mg tablet Take 0.5 mg by mouth 2 (two) times daily as needed.     Family History       Problem Relation (Age of Onset)    Hypertension Mother, Father          Tobacco Use    Smoking status: Every Day     Current packs/day: 1.00     Types: Cigarettes    Smokeless tobacco: Never   Substance and Sexual Activity    Alcohol use: Yes    Drug use: Never    Sexual activity: Not on file     Review of Systems   Constitutional: Negative.    HENT: Negative.     Eyes: Negative.    Respiratory:  Positive for chest tightness and shortness of breath.    Cardiovascular:  Positive for chest pain.   Gastrointestinal:  Positive for nausea.   Endocrine: Negative.    Genitourinary: Negative.    Musculoskeletal: Negative.    Skin: Negative.    Allergic/Immunologic: Negative.    Neurological: Negative.    Hematological: Negative.    Psychiatric/Behavioral: Negative.       Objective:     Vital Signs (Most Recent):  Temp: 97 °F (36.1 °C) (02/26/24 1020)  Pulse: 64 (02/26/24 1401)  Resp: 18 (02/26/24 1401)  BP: (!) 135/90 (02/26/24 1401)  SpO2: 97 % (02/26/24 1401) Vital Signs (24h Range):  Temp:  [97 °F (36.1 °C)] 97 °F (36.1 °C)  Pulse:  [64-76] 64  Resp:  [14-22] 18  SpO2:  [95 %-100 %] 97 %  BP: (115-162)/() 135/90     Weight: 102 kg (224 lb 13.9 oz)  Body mass index is 35.22 kg/m².     Physical Exam  Constitutional:       Appearance: Normal appearance. He is normal weight.   HENT:      Head: Normocephalic and atraumatic.      Nose: Nose normal.      Mouth/Throat:      Mouth: Mucous membranes are moist.      Pharynx: Oropharynx is clear.   Eyes:      Extraocular Movements: Extraocular movements intact and EOM normal.      Conjunctiva/sclera: Conjunctivae normal.      Pupils: Pupils are equal, round, and reactive to light.   Cardiovascular:      Rate and Rhythm: Normal rate and regular rhythm.      Pulses: Normal pulses.      Heart sounds: Normal heart sounds.   Pulmonary:      Effort: Pulmonary effort  is normal.      Breath sounds: Normal breath sounds.   Abdominal:      General: Bowel sounds are normal.      Palpations: Abdomen is soft.   Musculoskeletal:         General: Normal range of motion.      Cervical back: Normal range of motion and neck supple.   Skin:     General: Skin is warm and dry.      Capillary Refill: Capillary refill takes 2 to 3 seconds.   Neurological:      General: No focal deficit present.      Mental Status: He is alert and oriented to person, place, and time. Mental status is at baseline.   Psychiatric:         Mood and Affect: Mood normal.         Behavior: Behavior normal.         Thought Content: Thought content normal.         Judgment: Judgment normal.              CRANIAL NERVES     CN I  cranial nerve I not tested    CN II   Visual fields full to confrontation.     CN III, IV, VI   Pupils are equal, round, and reactive to light.  Extraocular motions are normal.   CN III: no CN III palsy  CN VI: no CN VI palsy    CN V   Facial sensation intact.     CN VII   Facial expression full, symmetric.     CN VIII   CN VIII normal.     CN IX, X   CN IX normal.     CN XI   CN XI normal.     CN XII   CN XII normal.        Significant Labs: All pertinent labs within the past 24 hours have been reviewed.  BMP:   Recent Labs   Lab 02/26/24  1024      K 3.8   CO2 22   BUN 14.0   CREATININE 1.19*   CALCIUM 9.8     CBC:   Recent Labs   Lab 02/26/24  1024   WBC 9.06   HGB 16.8   HCT 47.8        CMP:   Recent Labs   Lab 02/26/24  1024      K 3.8   CO2 22   BUN 14.0   CREATININE 1.19*   CALCIUM 9.8     Troponin:   Recent Labs   Lab 02/26/24  1024 02/26/24  1229   TROPONINI <0.010 <0.010       Significant Imaging: I have reviewed all pertinent imaging results/findings within the past 24 hours.  Assessment/Plan:     * Chest pain on breathing  Admit to observation  Serial troponins  Telemetry  ECHO  Aspirin  Lipid panel in am      Tobacco abuse  Advise to stop.  Can offer nicotine patch.   He is not interested in stopping(cessation=4mins)      Primary hypertension  Chronic, uncontrolled. Latest blood pressure and vitals reviewed-     Temp:  [97 °F (36.1 °C)]   Pulse:  [64-76]   Resp:  [14-22]   BP: (115-162)/()   SpO2:  [95 %-100 %] .   Home meds for hypertension were reviewed and noted below.       While in the hospital, will manage blood pressure as follows; Adjust home antihypertensive regimen as follows- not on any meds    Will utilize p.r.n. blood pressure medication only if patient's blood pressure greater than 140/90 and he develops symptoms such as worsening chest pain or shortness of breath.    Will start lisinopril  Remove NTG paste      VTE Risk Mitigation (From admission, onward)           Ordered     IP VTE HIGH RISK PATIENT  Once         02/26/24 1406     Place sequential compression device  Until discontinued         02/26/24 1406                       On 02/26/2024, patient should be placed in hospital observation services under my care.             Héctor Easley MD  Department of Hospital Medicine  [facility] - [facility] Surgical Unit

## 2024-02-26 NOTE — ED NOTES
Pt c/o chest pain when he takes a deep breath. Pt states the pain started around 5 AM this morning and worsened. Pt states the chest pain radiates to his left shoulder. He does not have a cardiac hx but does have anxiety and takes several medications for it. Pt notes he has never had this feeling w/ his anxiety. O2 sats on arrival was at 100% on room air.

## 2024-02-26 NOTE — ED NOTES
Patient was called with results of her echo report. She verbalized understanding of it   Pt requested that I call his wife Leti to let her know where he was and an update. Pt states he did not want visitors as of now. Notified Leti w/update and as of now no visitors. Verbal understanding. Updated pt that Leti was contacted and she is aware of update. Wifes cell# [phone].

## 2024-02-26 NOTE — ASSESSMENT & PLAN NOTE
Chronic, uncontrolled. Latest blood pressure and vitals reviewed-     Temp:  [97 °F (36.1 °C)]   Pulse:  [64-76]   Resp:  [14-22]   BP: (115-162)/()   SpO2:  [95 %-100 %] .   Home meds for hypertension were reviewed and noted below.       While in the hospital, will manage blood pressure as follows; Adjust home antihypertensive regimen as follows- not on any meds    Will utilize p.r.n. blood pressure medication only if patient's blood pressure greater than 140/90 and he develops symptoms such as worsening chest pain or shortness of breath.    Will start lisinopril  Remove NTG paste

## 2024-02-26 NOTE — SUBJECTIVE & OBJECTIVE
Past Medical History:   Diagnosis Date    Anxiety disorder, unspecified     Depression        History reviewed. No pertinent surgical history.    Review of patient's allergies indicates:  No Known Allergies    No current facility-administered medications on file prior to encounter.     Current Outpatient Medications on File Prior to Encounter   Medication Sig    hydrOXYzine pamoate (VISTARIL) 25 MG Cap SMARTSI Milligram(s) Gastro Tube Every 6 Hours PRN    buPROPion (WELLBUTRIN SR) 150 MG TBSR 12 hr tablet Take 150 mg by mouth 2 (two) times daily.    MYDAYIS 50 mg CT24 Take 1 capsule by mouth.    tadalafiL (CIALIS) 5 MG tablet Take 5 mg by mouth daily as needed.    VRAYLAR 1.5 mg Cap Take 1.5 mg by mouth.    XANAX 0.5 mg tablet Take 0.5 mg by mouth 2 (two) times daily as needed.     Family History       Problem Relation (Age of Onset)    Hypertension Mother, Father          Tobacco Use    Smoking status: Every Day     Current packs/day: 1.00     Types: Cigarettes    Smokeless tobacco: Never   Substance and Sexual Activity    Alcohol use: Yes    Drug use: Never    Sexual activity: Not on file     Review of Systems   Constitutional: Negative.    HENT: Negative.     Eyes: Negative.    Respiratory:  Positive for chest tightness and shortness of breath.    Cardiovascular:  Positive for chest pain.   Gastrointestinal:  Positive for nausea.   Endocrine: Negative.    Genitourinary: Negative.    Musculoskeletal: Negative.    Skin: Negative.    Allergic/Immunologic: Negative.    Neurological: Negative.    Hematological: Negative.    Psychiatric/Behavioral: Negative.       Objective:     Vital Signs (Most Recent):  Temp: 97 °F (36.1 °C) (24 1020)  Pulse: 64 (24 1401)  Resp: 18 (24 1401)  BP: (!) 135/90 (24 1401)  SpO2: 97 % (24 1401) Vital Signs (24h Range):  Temp:  [97 °F (36.1 °C)] 97 °F (36.1 °C)  Pulse:  [64-76] 64  Resp:  [14-22] 18  SpO2:  [95 %-100 %] 97 %  BP: (115-162)/() 135/90      Weight: 102 kg (224 lb 13.9 oz)  Body mass index is 35.22 kg/m².     Physical Exam  Constitutional:       Appearance: Normal appearance. He is normal weight.   HENT:      Head: Normocephalic and atraumatic.      Nose: Nose normal.      Mouth/Throat:      Mouth: Mucous membranes are moist.      Pharynx: Oropharynx is clear.   Eyes:      Extraocular Movements: Extraocular movements intact and EOM normal.      Conjunctiva/sclera: Conjunctivae normal.      Pupils: Pupils are equal, round, and reactive to light.   Cardiovascular:      Rate and Rhythm: Normal rate and regular rhythm.      Pulses: Normal pulses.      Heart sounds: Normal heart sounds.   Pulmonary:      Effort: Pulmonary effort is normal.      Breath sounds: Normal breath sounds.   Abdominal:      General: Bowel sounds are normal.      Palpations: Abdomen is soft.   Musculoskeletal:         General: Normal range of motion.      Cervical back: Normal range of motion and neck supple.   Skin:     General: Skin is warm and dry.      Capillary Refill: Capillary refill takes 2 to 3 seconds.   Neurological:      General: No focal deficit present.      Mental Status: He is alert and oriented to person, place, and time. Mental status is at baseline.   Psychiatric:         Mood and Affect: Mood normal.         Behavior: Behavior normal.         Thought Content: Thought content normal.         Judgment: Judgment normal.              CRANIAL NERVES     CN I  cranial nerve I not tested    CN II   Visual fields full to confrontation.     CN III, IV, VI   Pupils are equal, round, and reactive to light.  Extraocular motions are normal.   CN III: no CN III palsy  CN VI: no CN VI palsy    CN V   Facial sensation intact.     CN VII   Facial expression full, symmetric.     CN VIII   CN VIII normal.     CN IX, X   CN IX normal.     CN XI   CN XI normal.     CN XII   CN XII normal.        Significant Labs: All pertinent labs within the past 24 hours have been reviewed.  BMP:    Recent Labs   Lab 02/26/24  1024      K 3.8   CO2 22   BUN 14.0   CREATININE 1.19*   CALCIUM 9.8     CBC:   Recent Labs   Lab 02/26/24  1024   WBC 9.06   HGB 16.8   HCT 47.8        CMP:   Recent Labs   Lab 02/26/24  1024      K 3.8   CO2 22   BUN 14.0   CREATININE 1.19*   CALCIUM 9.8     Troponin:   Recent Labs   Lab 02/26/24  1024 02/26/24  1229   TROPONINI <0.010 <0.010       Significant Imaging: I have reviewed all pertinent imaging results/findings within the past 24 hours.

## 2024-02-26 NOTE — HPI
49 yo male presents to the ED c/o chest pain with radiation to his left shoulder with associated nausea and SOB.  It started about 5 hours prior to presentation while he was driving.  His BP was accelerated at 162/100.  He was given NTG with improvement of the pain and BP.  2 sets of troponin have been negative thus far.  No numbness or diaphoresis associated with the pain.  He does smoke tobacco and has some anxiety.  CTA of chest was performed and was negative.  He will be admitted for serial cardiac enzymes and monitoring.

## 2024-02-26 NOTE — ED PROVIDER NOTES
"Encounter Date: 2/26/2024       History     Chief Complaint   Patient presents with    Chest Pain     Chest pain started 5 hours today while driving, hurts worse when he takes a deep breathe describes pain as tightness. LOP 8. C/o some nausea, denies any coughing.      Patient is a 48-year-old white gentleman with no significant past medical history who presented to the ER today due to left-sided chest pain radiating to his left shoulder associated with nausea, shortness of breath.  Patient states started 5 hours prior to arrival.  Patient states it was worse with exertion and somewhat relieved with rest.  He did not take anything for it.  He does endorse a smoking history.  Denies any unilateral leg swelling or calf pain.  Patient states this started this morning while he was "driving. "He describes it as tightness.  Denies any fevers, chills, cough, congestion, abdominal pain.      Review of patient's allergies indicates:  No Known Allergies  Past Medical History:   Diagnosis Date    Anxiety disorder, unspecified     Depression      History reviewed. No pertinent surgical history.  Family History   Family history unknown: Yes     Social History     Tobacco Use    Smoking status: Every Day     Current packs/day: 1.00     Types: Cigarettes    Smokeless tobacco: Never   Substance Use Topics    Alcohol use: Yes    Drug use: Never     Review of Systems   Constitutional:  Negative for chills, fatigue and fever.   HENT:  Negative for congestion, sore throat and trouble swallowing.    Eyes:  Negative for pain and visual disturbance.   Respiratory:  Positive for chest tightness and shortness of breath. Negative for cough and wheezing.    Cardiovascular:  Positive for chest pain. Negative for palpitations.   Gastrointestinal:  Positive for nausea. Negative for abdominal pain, blood in stool, constipation, diarrhea and vomiting.   Genitourinary:  Negative for dysuria and hematuria.   Musculoskeletal:  Negative for back pain " and myalgias.   Skin:  Negative for rash and wound.   Neurological:  Negative for seizures, syncope and headaches.   Psychiatric/Behavioral:  Negative for confusion. The patient is not nervous/anxious.        Physical Exam     Initial Vitals [02/26/24 1020]   BP Pulse Resp Temp SpO2   (!) 162/100 76 (!) 22 97 °F (36.1 °C) 100 %      MAP       --         Physical Exam    Nursing note and vitals reviewed.  Constitutional: He appears well-developed and well-nourished. No distress.   HENT:   Head: Normocephalic and atraumatic.   Eyes: Conjunctivae and EOM are normal. Right eye exhibits no discharge. Left eye exhibits no discharge. No scleral icterus.   Neck: No tracheal deviation present.   Normal range of motion.  Cardiovascular:  Normal rate, regular rhythm and normal heart sounds.     Exam reveals no gallop and no friction rub.       No murmur heard.  Pulmonary/Chest: Breath sounds normal. No respiratory distress. He has no wheezes. He has no rhonchi. He has no rales.   Abdominal: Abdomen is soft. He exhibits no distension. There is no abdominal tenderness. There is no rebound and no guarding.   Musculoskeletal:         General: No edema. Normal range of motion.      Cervical back: Normal range of motion.     Neurological: He is alert.   Skin: Skin is warm and dry. No rash and no abscess noted. No erythema. No pallor.   Psychiatric: His behavior is normal. Judgment normal.         ED Course   Procedures  Labs Reviewed   BASIC METABOLIC PANEL - Abnormal; Notable for the following components:       Result Value    Creatinine 1.19 (*)     All other components within normal limits   CBC WITH DIFFERENTIAL - Abnormal; Notable for the following components:    IG# 0.13 (*)     All other components within normal limits   TROPONIN I - Normal   D DIMER, QUANTITATIVE - Normal   COVID/FLU A&B PCR - Normal    Narrative:     The Xpert Xpress SARS-CoV-2/FLU/RSV plus is a rapid, multiplexed real-time PCR test intended for the  simultaneous qualitative detection and differentiation of SARS-CoV-2, Influenza A, Influenza B, and respiratory syncytial virus (RSV) viral RNA in either nasopharyngeal swab or nasal swab specimens.         TROPONIN I - Normal   CBC W/ AUTO DIFFERENTIAL    Narrative:     The following orders were created for panel order CBC Auto Differential.  Procedure                               Abnormality         Status                     ---------                               -----------         ------                     CBC with Differential[[id number]]        Abnormal            Final result                 Please view results for these tests on the individual orders.     EKG Readings: (Independently Interpreted)   Initial Reading: No STEMI. Rhythm: Normal Sinus Rhythm. Heart Rate: 74. Ectopy: No Ectopy. Conduction: Normal. ST Segments: Normal ST Segments. T Waves: Normal. Axis: Normal.       Imaging Results              CTA Chest Aorta Non Coronary (Final result)  Result time 02/26/24 12:07:44      Final result by Rc Alexander MD (02/26/24 12:07:44)                   Impression:      1. Limited contrast opacification of the thoracic aorta without aortic aneurysmal dilatation or definite signs of dissection.    2. Lungs dependent hypoventilatory changes without acute infiltrates or congestive process.      Electronically signed by: Rc Alexander  Date:    02/26/2024  Time:    12:07               Narrative:    EXAMINATION:  CTA CHEST AORTA NON CORONARY    CLINICAL HISTORY:  Aortic dissection suspected;    TECHNIQUE:  Multidetector axial images were performed from thoracic inlet to below hemidiaphragms following intravenous contrast administration. Sagittal and coronal reformations performed.    Dose length product of 96 mGycm. Automated exposure control was utilized to minimize radiation dose.    COMPARISON:  Chest radiograph February 26, 2024.    FINDINGS:  There is suboptimal contrast bolus timing and there is intense  contrast opacification of the pulmonary arterial system and limited contrast opacification of the thoracic aorta.  Grossly, no thoracic aortic aneurysmal dilatation or signs of dissection.  There is no pericardial effusion.  Cardiac chambers are enlarged in size.    Bilateral lungs dependent hypoventilatory changes.  There is no hazy pneumonitis or pulmonary edema.  No consolidation focally or segmentally.  No fluid within the pleural spaces.                                       X-Ray Chest 1 View (Final result)  Result time 02/26/24 11:09:20      Final result by Cheng Bernal MD (02/26/24 11:09:20)                   Impression:      No acute findings.      Electronically signed by: Cheng Bernal  Date:    02/26/2024  Time:    11:09               Narrative:    EXAMINATION:  XR CHEST 1 VIEW    CLINICAL HISTORY:  cp;    COMPARISON:  15 January 2023    FINDINGS:  Frontal view of the chest was obtained. The heart is not enlarged.  Lungs are clear.  There is no pneumothorax or significant effusion.                                       Medications   aspirin chewable tablet 324 mg (324 mg Oral Given 2/26/24 1022)   nitroGLYCERIN SL tablet 0.4 mg (0.4 mg Sublingual Given 2/26/24 1030)   LORazepam (ATIVAN) injection 1 mg (1 mg Intravenous Given 2/26/24 1043)   iopamidoL (ISOVUE-370) injection 100 mL (100 mLs Intravenous Given 2/26/24 1148)   nitroGLYCERIN 2% TD oint ointment 0.5 inch (0.5 inches Topical (Top) Given 2/26/24 1233)     Medical Decision Making  Differentials: ACS, GERD, anxiety, costochondritis, PE   History in his the patient   48-year-old well-appearing male presents visibly uncomfortable with relatively typical chest pain.  Nitro significantly improve chest pain and EKG showed no ischemic changes.  Troponin negative x2 and basic labs reassuring.  Creatinine at 1.19 which is improved from his previous labs.  Nitro placed in place.  Hemodynamically stable.  Heart score 4.  D-dimer negative.  CTA of his  chest was done to rule out dissection which showed no evidence of this.  I discussed hospitalization with the patient and he was agreeable.  Discussed the case with Dr. Easley who accepted for further trops and echo.    Amount and/or Complexity of Data Reviewed  Labs: ordered. Decision-making details documented in ED Course.  Radiology: ordered. Decision-making details documented in ED Course.  ECG/medicine tests: ordered and independent interpretation performed. Decision-making details documented in ED Course.    Risk  OTC drugs.  Prescription drug management.  Decision regarding hospitalization.      Additional MDM:   PERC Rule:   Age is greater than or equal to 50 = 0.0  Heart Rate is greater than or equal to 100 = 0.0  SaO2 on room air < 95% = 0.0  Unilateral leg swelling = 0.0  Hemoptysis = 0.0  Recent surgery or trauma = 0.0  Prior PE or DVT =  0.0  Hormone use = 0.00  PERC Score = 0        Well's Criteria Score:  -Clinical symptoms of DVT (leg swelling, pain with palpation) = 0.0  -PE is #1 diagnosis OR equally likely =            0.0  -Heart Rate >100 =   0.0  -Immobilization (= or > than 3 days) or surgery in the previous 4 weeks = 0.0  -Previous DVT/PE = 0.0  -Hemoptysis =          0.0  -Malignancy =           0.0  Well's Probability Score =    0      Heart Score:    History:          Highly suspicious.  ECG:             Normal  Age:               45-65 years  Risk factors: 1-2 risk factors  Troponin:       Less than or equal to normal limit  Heart Score = 4                                       Clinical Impression:  Final diagnoses:  [R07.9] Chest pain  [R07.9] Chest pain of unknown etiology (Primary)          ED Disposition Condition    Observation Stable                Evangelista Sorto MD  02/26/24 8833

## 2024-02-27 VITALS
TEMPERATURE: 99 F | HEIGHT: 67 IN | OXYGEN SATURATION: 97 % | WEIGHT: 219.13 LBS | DIASTOLIC BLOOD PRESSURE: 74 MMHG | BODY MASS INDEX: 34.39 KG/M2 | HEART RATE: 70 BPM | RESPIRATION RATE: 18 BRPM | SYSTOLIC BLOOD PRESSURE: 123 MMHG

## 2024-02-27 PROBLEM — R07.1 CHEST PAIN ON BREATHING: Status: RESOLVED | Noted: 2024-02-26 | Resolved: 2024-02-27

## 2024-02-27 LAB
ANION GAP SERPL CALC-SCNC: 9 MEQ/L
AORTIC ROOT ANNULUS: 1.8 CM
AORTIC VALVE CUSP SEPERATION: 0.78 CM
AV PEAK GRADIENT: 7 MMHG
BASOPHILS # BLD AUTO: 0.07 X10(3)/MCL
BASOPHILS NFR BLD AUTO: 0.9 %
BSA FOR ECHO PROCEDURE: 2.2 M2
BUN SERPL-MCNC: 18 MG/DL (ref 8.9–20.6)
CALCIUM SERPL-MCNC: 9.2 MG/DL (ref 8.4–10.2)
CHLORIDE SERPL-SCNC: 107 MMOL/L (ref 98–107)
CHOLEST SERPL-MCNC: 210 MG/DL
CHOLEST/HDLC SERPL: 6 {RATIO} (ref 0–5)
CO2 SERPL-SCNC: 23 MMOL/L (ref 22–29)
CREAT SERPL-MCNC: 1.11 MG/DL (ref 0.73–1.18)
CREAT/UREA NIT SERPL: 16
CV ECHO LV RWT: 0.45 CM
DOP CALC AO PEAK VEL: 1.35 M/S
E WAVE DECELERATION TIME: 291.86 MSEC
E/A RATIO: 1.12
ECHO LV POSTERIOR WALL: 1.02 CM (ref 0.6–1.1)
EOSINOPHIL # BLD AUTO: 0.14 X10(3)/MCL (ref 0–0.9)
EOSINOPHIL NFR BLD AUTO: 1.8 %
ERYTHROCYTE [DISTWIDTH] IN BLOOD BY AUTOMATED COUNT: 13.6 % (ref 11.5–17)
FRACTIONAL SHORTENING: 27 % (ref 28–44)
GFR SERPLBLD CREATININE-BSD FMLA CKD-EPI: >60 MLS/MIN/1.73/M2
GLUCOSE SERPL-MCNC: 96 MG/DL (ref 74–100)
HCT VFR BLD AUTO: 45.9 % (ref 42–52)
HDLC SERPL-MCNC: 36 MG/DL (ref 35–60)
HGB BLD-MCNC: 15.9 G/DL (ref 14–18)
IMM GRANULOCYTES # BLD AUTO: 0.11 X10(3)/MCL (ref 0–0.04)
IMM GRANULOCYTES NFR BLD AUTO: 1.4 %
INTERVENTRICULAR SEPTUM: 1.01 CM (ref 0.6–1.1)
LDLC SERPL CALC-MCNC: 106 MG/DL (ref 50–140)
LEFT INTERNAL DIMENSION IN SYSTOLE: 3.34 CM (ref 2.1–4)
LEFT VENTRICLE DIASTOLIC VOLUME INDEX: 45.52 ML/M2
LEFT VENTRICLE DIASTOLIC VOLUME: 95.6 ML
LEFT VENTRICLE MASS INDEX: 76 G/M2
LEFT VENTRICLE SYSTOLIC VOLUME INDEX: 21.6 ML/M2
LEFT VENTRICLE SYSTOLIC VOLUME: 45.36 ML
LEFT VENTRICULAR INTERNAL DIMENSION IN DIASTOLE: 4.56 CM (ref 3.5–6)
LEFT VENTRICULAR MASS: 159.82 G
LYMPHOCYTES # BLD AUTO: 2.4 X10(3)/MCL (ref 0.6–4.6)
LYMPHOCYTES NFR BLD AUTO: 30.8 %
MAGNESIUM SERPL-MCNC: 2.1 MG/DL (ref 1.6–2.6)
MCH RBC QN AUTO: 30.2 PG (ref 27–31)
MCHC RBC AUTO-ENTMCNC: 34.6 G/DL (ref 33–36)
MCV RBC AUTO: 87.1 FL (ref 80–94)
MONOCYTES # BLD AUTO: 0.6 X10(3)/MCL (ref 0.1–1.3)
MONOCYTES NFR BLD AUTO: 7.7 %
MV PEAK A VEL: 0.74 M/S
MV PEAK E VEL: 0.83 M/S
MV STENOSIS PRESSURE HALF TIME: 84.64 MS
MV VALVE AREA P 1/2 METHOD: 2.6 CM2
NEUTROPHILS # BLD AUTO: 4.48 X10(3)/MCL (ref 2.1–9.2)
NEUTROPHILS NFR BLD AUTO: 57.4 %
NRBC BLD AUTO-RTO: 0 %
OHS LV EJECTION FRACTION SIMPSONS BIPLANE MOD: 52 %
OHS QRS DURATION: 102 MS
OHS QTC CALCULATION: 401 MS
PISA MRMAX VEL: 1.78 M/S
PLATELET # BLD AUTO: 199 X10(3)/MCL (ref 130–400)
PMV BLD AUTO: 9.2 FL (ref 7.4–10.4)
POTASSIUM SERPL-SCNC: 4.1 MMOL/L (ref 3.5–5.1)
PV PEAK GRADIENT: 4 MMHG
PV PEAK VELOCITY: 0.96 M/S
RA PRESSURE ESTIMATED: 3 MMHG
RBC # BLD AUTO: 5.27 X10(6)/MCL (ref 4.7–6.1)
RIGHT VENTRICULAR END-DIASTOLIC DIMENSION: 2.69 CM
SODIUM SERPL-SCNC: 139 MMOL/L (ref 136–145)
TRICUSPID VALVE PEAK A WAVE VELOCITY: 0.63 M/S
TRIGL SERPL-MCNC: 340 MG/DL (ref 34–140)
TROPONIN I SERPL-MCNC: <0.01 NG/ML (ref 0–0.04)
TV PEAK E VEL: 0.6 M/S
TV STENOSIS PRESSURE HALF TIME: 57.2 MS
TV VALVE AREA P 1/2 METHOD: 3.32 CM2
VLDLC SERPL CALC-MCNC: 68 MG/DL
WBC # SPEC AUTO: 7.8 X10(3)/MCL (ref 4.5–11.5)
Z-SCORE OF LEFT VENTRICULAR DIMENSION IN END DIASTOLE: -3.59
Z-SCORE OF LEFT VENTRICULAR DIMENSION IN END SYSTOLE: -1.4

## 2024-02-27 PROCEDURE — 85025 COMPLETE CBC W/AUTO DIFF WBC: CPT | Performed by: STUDENT IN AN ORGANIZED HEALTH CARE EDUCATION/TRAINING PROGRAM

## 2024-02-27 PROCEDURE — 84484 ASSAY OF TROPONIN QUANT: CPT | Performed by: STUDENT IN AN ORGANIZED HEALTH CARE EDUCATION/TRAINING PROGRAM

## 2024-02-27 PROCEDURE — 94761 N-INVAS EAR/PLS OXIMETRY MLT: CPT

## 2024-02-27 PROCEDURE — 80048 BASIC METABOLIC PNL TOTAL CA: CPT | Performed by: STUDENT IN AN ORGANIZED HEALTH CARE EDUCATION/TRAINING PROGRAM

## 2024-02-27 PROCEDURE — G0378 HOSPITAL OBSERVATION PER HR: HCPCS

## 2024-02-27 PROCEDURE — 83735 ASSAY OF MAGNESIUM: CPT | Performed by: INTERNAL MEDICINE

## 2024-02-27 PROCEDURE — 80061 LIPID PANEL: CPT | Performed by: INTERNAL MEDICINE

## 2024-02-27 PROCEDURE — 25000003 PHARM REV CODE 250: Performed by: INTERNAL MEDICINE

## 2024-02-27 RX ORDER — LISINOPRIL 10 MG/1
10 TABLET ORAL DAILY
Qty: 30 TABLET | Refills: 0 | Status: SHIPPED | OUTPATIENT
Start: 2024-02-28 | End: 2024-03-29

## 2024-02-27 RX ORDER — NAPROXEN SODIUM 220 MG/1
81 TABLET, FILM COATED ORAL DAILY
Qty: 30 TABLET | Refills: 0 | Status: SHIPPED | OUTPATIENT
Start: 2024-02-28 | End: 2024-03-29

## 2024-02-27 RX ORDER — ATORVASTATIN CALCIUM 40 MG/1
40 TABLET, FILM COATED ORAL NIGHTLY
Qty: 30 TABLET | Refills: 0 | Status: SHIPPED | OUTPATIENT
Start: 2024-02-27 | End: 2024-03-28

## 2024-02-27 RX ADMIN — TAMSULOSIN HYDROCHLORIDE 0.4 MG: 0.4 CAPSULE ORAL at 08:02

## 2024-02-27 RX ADMIN — DULOXETINE HYDROCHLORIDE 20 MG: 20 CAPSULE, DELAYED RELEASE ORAL at 08:02

## 2024-02-27 RX ADMIN — LISINOPRIL 10 MG: 5 TABLET ORAL at 08:02

## 2024-02-27 RX ADMIN — ASPIRIN 81 MG 81 MG: 81 TABLET ORAL at 08:02

## 2024-02-27 RX ADMIN — BUPROPION HYDROCHLORIDE 300 MG: 300 TABLET, FILM COATED, EXTENDED RELEASE ORAL at 08:02

## 2024-02-27 NOTE — NURSING
Results were still pending. Called Radiology this morning and asked if they could find out why there was still no results. Called Ultrasound at noon and was informed that EF was told to Dr. Easley was 52%. Notified Dr. Romero to let her know that results were still pending but Dr. Easley  was notified by Ultrasound of the EF. Dr. Romero ok to discharge

## 2024-02-27 NOTE — PROGRESS NOTES
02/27/24 0950   Discharge Assessment   Assessment Type Discharge Planning Assessment   Confirmed/corrected address, phone number and insurance Yes   Confirmed Demographics Correct on Facesheet   Source of Information patient   Reason For Admission Chest Pain   Do you expect to return to your current living situation? Yes   Do you have help at home or someone to help you manage your care at home? No   Prior to hospitilization cognitive status: Alert/Oriented   Current cognitive status: Alert/Oriented   Walking or Climbing Stairs Difficulty no   Dressing/Bathing Difficulty no   Equipment Currently Used at Home CPAP   Readmission within 30 days? No   Patient currently being followed by outpatient case management? No   Do you currently have service(s) that help you manage your care at home? No   Do you take prescription medications? Yes   Do you have prescription coverage? Yes   Coverage [employer]   Do you have any problems affording any of your prescribed medications? No   Is the patient taking medications as prescribed? yes   Are you on dialysis? No   Do you take coumadin? No   Discharge Plan A Home   DME Needed Upon Discharge  none   Discharge Plan discussed with: Patient   Transition of Care Barriers None   Physical Activity   On average, how many days per week do you engage in moderate to strenuous exercise (like a brisk walk)? 6 days   On average, how many minutes do you engage in exercise at this level? 10 min   Financial Resource Strain   How hard is it for you to pay for the very basics like food, housing, medical care, and heating? Not hard   Housing Stability   In the last 12 months, was there a time when you were not able to pay the mortgage or rent on time? N   In the last 12 months, how many places have you lived? 1   In the last 12 months, was there a time when you did not have a steady place to sleep or slept in a shelter (including now)? N   Transportation Needs   In the past 12 months, has lack of  transportation kept you from medical appointments or from getting medications? no   In the past 12 months, has lack of transportation kept you from meetings, work, or from getting things needed for daily living? No   Food Insecurity   Within the past 12 months, you worried that your food would run out before you got the money to buy more. Never true   Within the past 12 months, the food you bought just didn't last and you didn't have money to get more. Never true   Stress   Do you feel stress - tense, restless, nervous, or anxious, or unable to sleep at night because your mind is troubled all the time - these days? Not at all   Social Connections   In a typical week, how many times do you talk on the phone with family, friends, or neighbors? Three     PCP: Dr. Frederick Hernandez. Previously totally independently. Plan to return home once discharged. Will coordinate discharge plan as ordered per attending MD.

## 2024-02-27 NOTE — ASSESSMENT & PLAN NOTE
Advise to stop.  Can offer nicotine patch.  He is not interested in stopping(cessation=4mins)  2/27/24: Pt plans to quit smoking. He declines rx. He will quit on his own. Counseled pt for 4 minutes.

## 2024-02-27 NOTE — HOSPITAL COURSE
Pt's chest pain improved overnight. He does confirm that he was not anxious when he began having symptoms of CP, chest tightness, sob, and left arm pain and numbness yesterday. He denies Fhx CAD. He states this morning his chest feels sore but denies cp/tightness. Denies SOB.  I discussed his lipid panel results with him. Trig 340. Chol/hdl ratio 6. He agrees to start a statin and follow-up with cards. Pt plans to follow-up with his cardiologist, Dr. Jerry Hernandez, whom he has seen in the past. Explained his new rxs to him-aspirin 81mg, lisinopril 10mg, and atorvastatin 40mg. Sent to the pharmacy. He declines nicotine patch. States he has tried this and chanitx in the past and did not have desired results. He states that now his anxiety is better controlled with is current medication regimen so he plans to stop smoking on his own. Echo results are pending this AM.     PE:   General: alert and oriented.  HEENT: NC, AT.  Resp: CTAB.  Cardio: RRR, no m/r/g. No edema.  Abd: soft, NT, ND, + BS x 4

## 2024-02-27 NOTE — DISCHARGE INSTRUCTIONS
You were prescribed new medications lisinopril (blood pressure),  atorvastatin (cholesterol) and Aspirin (helps to prevent blood clots).  Take medications as prescribed. If you continue to have chest pain or shortness of breath contact your Primary Care Physician or go to the nearest ER.     Thank you for choosing [facility].

## 2024-02-27 NOTE — PLAN OF CARE
Problem: Adult Inpatient Plan of Care  Goal: Plan of Care Review  Outcome: Met  Goal: Patient-Specific Goal (Individualized)  Outcome: Met  Goal: Absence of Hospital-Acquired Illness or Injury  Outcome: Met  Goal: Optimal Comfort and Wellbeing  Outcome: Met  Goal: Readiness for Transition of Care  Outcome: Met     Problem: Pain Acute  Goal: Acceptable Pain Control and Functional Ability  Outcome: Met     Problem: Fatigue  Goal: Improved Activity Tolerance  Outcome: Met     Problem: Behavioral Health Comorbidity  Goal: Maintenance of Behavioral Health Symptom Control  Outcome: Met     Problem: Hypertension Comorbidity  Goal: Blood Pressure in Desired Range  Outcome: Met     Problem: Anxiety  Goal: Anxiety Reduction or Resolution  Outcome: Met     Problem: Depression  Goal: Improved Mood  Outcome: Met     Problem: Chest Pain  Goal: Resolution of Chest Pain Symptoms  Outcome: Met

## 2024-02-27 NOTE — ASSESSMENT & PLAN NOTE
Chronic, uncontrolled. Latest blood pressure and vitals reviewed-     Temp:  [98.1 °F (36.7 °C)-98.5 °F (36.9 °C)]   Pulse:  [64-83]   Resp:  [14-20]   BP: (115-137)/(71-96)   SpO2:  [95 %-99 %] .   Home meds for hypertension were reviewed and noted below.       While in the hospital, will manage blood pressure as follows; Adjust home antihypertensive regimen as follows- not on any meds    Will utilize p.r.n. blood pressure medication only if patient's blood pressure greater than 140/90 and he develops symptoms such as worsening chest pain or shortness of breath.    Will start lisinopril  Remove NTG paste    2/27/24: dc on lisinopril 10mg.

## 2024-02-27 NOTE — DISCHARGE SUMMARY
[facility] Medical Surgical [facility]  [facility] Medicine  Discharge Summary      Patient Name: Raheel Knowles  MRN: [id number]  SMITHA: [id number]  Patient Class: OP- Observation  Admission Date: 2/26/2024  Hospital Length of Stay: 0 days  Discharge Date and Time:  02/27/2024 10:37 AM  Attending Physician: Brittani Romero DO   Discharging Provider: BRITTANI OCONNOR DO  Primary Care Provider: Frederick Hernandez MD    Primary Care Team: Networked reference to record PCT     HPI:   47 yo male presents to the ED c/o chest pain with radiation to his left shoulder with associated nausea and SOB.  It started about 5 hours prior to presentation while he was driving.  His BP was accelerated at 162/100.  He was given NTG with improvement of the pain and BP.  2 sets of troponin have been negative thus far.  No numbness or diaphoresis associated with the pain.  He does smoke tobacco and has some anxiety.  CTA of chest was performed and was negative.  He will be admitted for serial cardiac enzymes and monitoring.    * No surgery found *      Hospital Course:   Pt's chest pain improved overnight. He does confirm that he was not anxious when he began having symptoms of CP, chest tightness, sob, and left arm pain and numbness yesterday. He denies Fhx CAD. He states this morning his chest feels sore but denies cp/tightness. Denies SOB.  I discussed his lipid panel results with him. Trig 340. Chol/hdl ratio 6. He agrees to start a statin and follow-up with cards. Pt plans to follow-up with his cardiologist, Dr. Jerry Hernandez, whom he has seen in the past. Explained his new rxs to him-aspirin 81mg, lisinopril 10mg, and atorvastatin 40mg. Sent to the pharmacy. He declines nicotine patch. States he has tried this and chanitx in the past and did not have desired results. He states that now his anxiety is better controlled with is current medication regimen so he plans to stop smoking on his own. Echo results are  pending this AM.     PE:   General: alert and oriented.  HEENT: NC, AT.  Resp: CTAB.  Cardio: RRR, no m/r/g. No edema.  Abd: soft, NT, ND, + BS x 4       Goals of Care Treatment Preferences:  Code Status: Full Code      Consults:     Cardiac/Vascular  Primary hypertension  Chronic, uncontrolled. Latest blood pressure and vitals reviewed-     Temp:  [98.1 °F (36.7 °C)-98.5 °F (36.9 °C)]   Pulse:  [64-83]   Resp:  [14-20]   BP: (115-137)/(71-96)   SpO2:  [95 %-99 %] .   Home meds for hypertension were reviewed and noted below.       While in the hospital, will manage blood pressure as follows; Adjust home antihypertensive regimen as follows- not on any meds    Will utilize p.r.n. blood pressure medication only if patient's blood pressure greater than 140/90 and he develops symptoms such as worsening chest pain or shortness of breath.    Will start lisinopril  Remove NTG paste    2/27/24: dc on lisinopril 10mg.    Other  Tobacco abuse  Advise to stop.  Can offer nicotine patch.  He is not interested in stopping(cessation=4mins)  2/27/24: Pt plans to quit smoking. He declines rx. He will quit on his own. Counseled pt for 4 minutes.        Final Active Diagnoses:    Diagnosis Date Noted POA    Primary hypertension [I10] 02/26/2024 Yes    Tobacco abuse [Z72.0] 02/26/2024 Yes      Problems Resolved During this Admission:    Diagnosis Date Noted Date Resolved POA    PRINCIPAL PROBLEM:  Chest pain on breathing [R07.1] 02/26/2024 02/27/2024 Yes       Discharged Condition: good    Disposition:     Follow Up:   Follow-up Information       Frederick Hernandez MD. Go on 3/5/2024.    Specialty: Internal Medicine  Why: @2:30pm follow up appt  Contact information:  [address] [place] [address]  [phone]               Dwight Hernandez MD. Go on 2/27/2024.    Specialty: Cardiology  Why: left message  Contact information:  [address]  [place] [id number] [phone]                           Patient  Instructions:   No discharge procedures on file.    Significant Diagnostic Studies: Labs: All labs within the past 24 hours have been reviewed    Pending Diagnostic Studies:       Procedure Component Value Units Date/Time    Troponin I [[id number]]     Order Status: Sent Lab Status: No result     Specimen: Blood            Medications:  Reconciled Home Medications:      Medication List        START taking these medications      aspirin 81 MG Chew  Take 1 tablet (81 mg total) by mouth once daily.  Start taking on: February 28, 2024     atorvastatin 40 MG tablet  Commonly known as: LIPITOR  Take 1 tablet (40 mg total) by mouth every evening.     lisinopriL 10 MG tablet  Take 1 tablet (10 mg total) by mouth once daily.  Start taking on: February 28, 2024            CONTINUE taking these medications      buPROPion 300 MG 24 hr tablet  Commonly known as: WELLBUTRIN XL  Take 300 mg by mouth once daily.     DULoxetine 20 MG capsule  Commonly known as: CYMBALTA  Take 20 mg by mouth once daily.     hydrOXYzine pamoate 25 MG Cap  Commonly known as: VISTARIL  Take 25 mg by mouth 3 (three) times daily as needed (anxiety).     QUEtiapine 25 MG Tab  Commonly known as: SEROQUEL  Take 25 mg by mouth every evening.     tadalafiL 5 MG tablet  Commonly known as: CIALIS  Take 5 mg by mouth daily as needed.     tamsulosin 0.4 mg Cap  Commonly known as: FLOMAX  Take 0.4 mg by mouth once daily.     XANAX 0.5 MG tablet  Generic drug: ALPRAZolam  Take 0.5 mg by mouth 2 (two) times daily as needed.              Indwelling Lines/Drains at time of discharge:   Lines/Drains/Airways       None                   Time spent on the discharge of patient: 47 minutes         MARIN OCONNOR DO  Department of Hospital Medicine  [facility] - Medical Surgical Unit

## 2024-02-27 NOTE — PLAN OF CARE
Pt reports improvement in chest pain of 8/10 on 2/26/24 when admitted to 1/10 chest pain at rest and 2/10 chest pain with activity throughout shift. Cardiac enzymes remain negative. Continuous cardiac monitoring in progress.

## 2024-12-09 ENCOUNTER — LAB VISIT (OUTPATIENT)
Dept: LAB | Facility: HOSPITAL | Age: 49
End: 2024-12-09
Attending: UROLOGY
Payer: COMMERCIAL

## 2024-12-09 DIAGNOSIS — N48.9 DISEASE OF PENIS: ICD-10-CM

## 2024-12-09 DIAGNOSIS — R97.20 ELEVATED PROSTATE SPECIFIC ANTIGEN (PSA): Primary | ICD-10-CM

## 2024-12-09 DIAGNOSIS — E29.1 3-OXO-5 ALPHA-STEROID DELTA 4-DEHYDROGENASE DEFICIENCY: ICD-10-CM

## 2024-12-09 LAB
BASOPHILS # BLD AUTO: 0.06 X10(3)/MCL
BASOPHILS NFR BLD AUTO: 0.9 %
EOSINOPHIL # BLD AUTO: 0.1 X10(3)/MCL (ref 0–0.9)
EOSINOPHIL NFR BLD AUTO: 1.4 %
ERYTHROCYTE [DISTWIDTH] IN BLOOD BY AUTOMATED COUNT: 12.7 % (ref 11.5–17)
HCT VFR BLD AUTO: 47.2 % (ref 42–52)
HGB BLD-MCNC: 16.1 G/DL (ref 14–18)
IMM GRANULOCYTES # BLD AUTO: 0.05 X10(3)/MCL (ref 0–0.04)
IMM GRANULOCYTES NFR BLD AUTO: 0.7 %
LYMPHOCYTES # BLD AUTO: 1.98 X10(3)/MCL (ref 0.6–4.6)
LYMPHOCYTES NFR BLD AUTO: 28.4 %
MCH RBC QN AUTO: 29.4 PG (ref 27–31)
MCHC RBC AUTO-ENTMCNC: 34.1 G/DL (ref 33–36)
MCV RBC AUTO: 86.1 FL (ref 80–94)
MONOCYTES # BLD AUTO: 0.5 X10(3)/MCL (ref 0.1–1.3)
MONOCYTES NFR BLD AUTO: 7.2 %
NEUTROPHILS # BLD AUTO: 4.29 X10(3)/MCL (ref 2.1–9.2)
NEUTROPHILS NFR BLD AUTO: 61.4 %
NRBC BLD AUTO-RTO: 0 %
PLATELET # BLD AUTO: 215 X10(3)/MCL (ref 130–400)
PMV BLD AUTO: 9.5 FL (ref 7.4–10.4)
PSA SERPL-MCNC: 5.85 NG/ML
RBC # BLD AUTO: 5.48 X10(6)/MCL (ref 4.7–6.1)
TESTOST SERPL-MCNC: 178.51 NG/DL (ref 240.24–870.68)
WBC # BLD AUTO: 6.98 X10(3)/MCL (ref 4.5–11.5)

## 2024-12-09 PROCEDURE — 84153 ASSAY OF PSA TOTAL: CPT

## 2024-12-09 PROCEDURE — 36415 COLL VENOUS BLD VENIPUNCTURE: CPT

## 2024-12-09 PROCEDURE — 85025 COMPLETE CBC W/AUTO DIFF WBC: CPT

## 2024-12-09 PROCEDURE — 84403 ASSAY OF TOTAL TESTOSTERONE: CPT

## 2025-03-13 ENCOUNTER — HOSPITAL ENCOUNTER (OUTPATIENT)
Dept: RADIOLOGY | Facility: HOSPITAL | Age: 50
Discharge: HOME | End: 2025-03-13
Attending: NURSE PRACTITIONER
Payer: COMMERCIAL

## 2025-03-13 DIAGNOSIS — R31.0 GROSS HEMATURIA: ICD-10-CM

## 2025-03-13 DIAGNOSIS — R10.9 STOMACH ACHE: ICD-10-CM

## 2025-03-13 DIAGNOSIS — N20.0 KIDNEY STONE: ICD-10-CM

## 2025-03-13 PROCEDURE — 74176 CT ABD & PELVIS W/O CONTRAST: CPT | Mod: TC

## 2025-03-20 ENCOUNTER — LAB VISIT (OUTPATIENT)
Dept: LAB | Facility: HOSPITAL | Age: 50
End: 2025-03-20
Attending: NURSE PRACTITIONER
Payer: COMMERCIAL

## 2025-03-20 DIAGNOSIS — R94.4 NONSPECIFIC ABNORMAL RESULTS OF KIDNEY FUNCTION STUDY: ICD-10-CM

## 2025-03-20 DIAGNOSIS — N20.0 URIC ACID NEPHROLITHIASIS: Primary | ICD-10-CM

## 2025-03-20 LAB
ANION GAP SERPL CALC-SCNC: 7 MEQ/L
BUN SERPL-MCNC: 14 MG/DL (ref 8.9–20.6)
CALCIUM SERPL-MCNC: 9.5 MG/DL (ref 8.4–10.2)
CHLORIDE SERPL-SCNC: 106 MMOL/L (ref 98–107)
CO2 SERPL-SCNC: 26 MMOL/L (ref 22–29)
CREAT SERPL-MCNC: 1.22 MG/DL (ref 0.72–1.25)
CREAT/UREA NIT SERPL: 11
GFR SERPLBLD CREATININE-BSD FMLA CKD-EPI: >60 ML/MIN/1.73/M2
GLUCOSE SERPL-MCNC: 94 MG/DL (ref 74–100)
POTASSIUM SERPL-SCNC: 4.5 MMOL/L (ref 3.5–5.1)
SODIUM SERPL-SCNC: 139 MMOL/L (ref 136–145)

## 2025-03-20 PROCEDURE — 80048 BASIC METABOLIC PNL TOTAL CA: CPT

## 2025-03-20 PROCEDURE — 36415 COLL VENOUS BLD VENIPUNCTURE: CPT

## 2025-07-23 ENCOUNTER — TELEPHONE (OUTPATIENT)
Dept: UROLOGY | Facility: CLINIC | Age: 50
End: 2025-07-23
Payer: COMMERCIAL

## 2025-07-25 ENCOUNTER — TELEPHONE (OUTPATIENT)
Dept: UROLOGY | Facility: CLINIC | Age: 50
End: 2025-07-25
Payer: COMMERCIAL

## 2025-07-25 DIAGNOSIS — R97.20 ELEVATED PSA: Primary | ICD-10-CM

## 2025-07-25 NOTE — TELEPHONE ENCOUNTER
Copied from CRM #[id number]. Topic: General Inquiry - Return Call  >> Jul 23, 2025 11:22 AM Summer wrote:  Type:  Patient Returning Call    Who Called: Raheel   Who Left Message for Patient: Celeste Pope MA   Does the patient know what this is regarding?: Yes referral   Would the patient rather a call back or a response via [facility] [facility]? Callback   Best Call Back Number: [phone]  Additional Information:

## 2025-09-04 ENCOUNTER — OFFICE VISIT (OUTPATIENT)
Dept: UROLOGY | Facility: CLINIC | Age: 50
End: 2025-09-04
Payer: COMMERCIAL

## 2025-09-04 VITALS
HEIGHT: 73 IN | HEART RATE: 77 BPM | OXYGEN SATURATION: 99 % | SYSTOLIC BLOOD PRESSURE: 140 MMHG | DIASTOLIC BLOOD PRESSURE: 82 MMHG | WEIGHT: 235 LBS | BODY MASS INDEX: 31.14 KG/M2

## 2025-09-04 DIAGNOSIS — R97.20 ELEVATED PSA: Primary | ICD-10-CM

## 2025-09-04 DIAGNOSIS — N52.9 ERECTILE DYSFUNCTION, UNSPECIFIED ERECTILE DYSFUNCTION TYPE: ICD-10-CM

## 2025-09-04 DIAGNOSIS — N40.0 BENIGN PROSTATIC HYPERPLASIA, UNSPECIFIED WHETHER LOWER URINARY TRACT SYMPTOMS PRESENT: ICD-10-CM

## 2025-09-04 LAB
POC RESIDUAL URINE VOLUME: 0 ML (ref 0–100)
PSA, DIAGNOSTIC: 6.26 NG/ML (ref 0–4)

## 2025-09-04 PROCEDURE — 99204 OFFICE O/P NEW MOD 45 MIN: CPT | Mod: S$GLB,,, | Performed by: UROLOGY

## 2025-09-04 PROCEDURE — 1159F MED LIST DOCD IN RCRD: CPT | Mod: CPTII,S$GLB,, | Performed by: UROLOGY

## 2025-09-04 PROCEDURE — 3008F BODY MASS INDEX DOCD: CPT | Mod: CPTII,S$GLB,, | Performed by: UROLOGY

## 2025-09-04 PROCEDURE — 3077F SYST BP >= 140 MM HG: CPT | Mod: CPTII,S$GLB,, | Performed by: UROLOGY

## 2025-09-04 PROCEDURE — 51798 US URINE CAPACITY MEASURE: CPT | Mod: S$GLB,,, | Performed by: UROLOGY

## 2025-09-04 PROCEDURE — 3079F DIAST BP 80-89 MM HG: CPT | Mod: CPTII,S$GLB,, | Performed by: UROLOGY

## 2025-09-04 RX ORDER — HYDROCHLOROTHIAZIDE 12.5 MG/1
12.5 TABLET ORAL DAILY
COMMUNITY

## 2025-09-04 RX ORDER — SILDENAFIL 100 MG/1
100 TABLET, FILM COATED ORAL DAILY PRN
Qty: 30 TABLET | Refills: 11 | Status: SHIPPED | OUTPATIENT
Start: 2025-09-04 | End: 2026-09-04

## 2025-09-04 RX ORDER — FAMOTIDINE 20 MG/1
20 TABLET, FILM COATED ORAL 2 TIMES DAILY
COMMUNITY

## 2025-09-04 RX ORDER — EZETIMIBE 10 MG/1
10 TABLET ORAL DAILY
COMMUNITY
Start: 2025-03-13

## 2025-09-05 ENCOUNTER — TELEPHONE (OUTPATIENT)
Dept: UROLOGY | Facility: CLINIC | Age: 50
End: 2025-09-05
Payer: COMMERCIAL